# Patient Record
Sex: MALE | URBAN - METROPOLITAN AREA
[De-identification: names, ages, dates, MRNs, and addresses within clinical notes are randomized per-mention and may not be internally consistent; named-entity substitution may affect disease eponyms.]

---

## 2024-11-22 ENCOUNTER — TRANSCRIBE ORDERS (OUTPATIENT)
Dept: ADMINISTRATIVE | Age: 59
End: 2024-11-22

## 2024-11-22 DIAGNOSIS — F17.210 CIGARETTE SMOKER: Primary | ICD-10-CM

## 2024-12-16 ENCOUNTER — CLINICAL DOCUMENTATION (OUTPATIENT)
Dept: CASE MANAGEMENT | Age: 59
End: 2024-12-16

## 2024-12-16 VITALS — WEIGHT: 109.5 LBS | BODY MASS INDEX: 14.83 KG/M2 | HEIGHT: 72 IN

## 2025-07-15 ENCOUNTER — APPOINTMENT (OUTPATIENT)
Dept: CT IMAGING | Age: 60
DRG: 190 | End: 2025-07-15
Payer: MEDICARE

## 2025-07-15 ENCOUNTER — APPOINTMENT (OUTPATIENT)
Dept: GENERAL RADIOLOGY | Age: 60
DRG: 190 | End: 2025-07-15
Payer: MEDICARE

## 2025-07-15 ENCOUNTER — HOSPITAL ENCOUNTER (INPATIENT)
Age: 60
LOS: 8 days | Discharge: NURSING, REHAB OR LONG-TERM CARE | DRG: 190 | End: 2025-07-24
Attending: STUDENT IN AN ORGANIZED HEALTH CARE EDUCATION/TRAINING PROGRAM | Admitting: HOSPITALIST
Payer: MEDICARE

## 2025-07-15 DIAGNOSIS — R29.6 MULTIPLE FALLS: ICD-10-CM

## 2025-07-15 DIAGNOSIS — J44.1 COPD EXACERBATION (HCC): ICD-10-CM

## 2025-07-15 DIAGNOSIS — R62.7 FAILURE TO THRIVE IN ADULT: ICD-10-CM

## 2025-07-15 DIAGNOSIS — J96.22 ACUTE ON CHRONIC RESPIRATORY FAILURE WITH HYPERCAPNIA (HCC): Primary | ICD-10-CM

## 2025-07-15 DIAGNOSIS — S92.301A MULTIPLE CLOSED FRACTURES OF METATARSAL BONE OF RIGHT FOOT, INITIAL ENCOUNTER: ICD-10-CM

## 2025-07-15 DIAGNOSIS — Z91.199 NONCOMPLIANCE WITH TREATMENT: ICD-10-CM

## 2025-07-15 DIAGNOSIS — R78.81 BACTEREMIA DUE TO GRAM-POSITIVE BACTERIA: ICD-10-CM

## 2025-07-15 LAB
BASOPHILS # BLD: 0.01 K/UL (ref 0–0.2)
BASOPHILS NFR BLD: 0 % (ref 0–2)
BNP SERPL-MCNC: 314 PG/ML (ref 0–125)
CHP ED QC CHECK: NORMAL
EOSINOPHIL # BLD: 0.01 K/UL (ref 0.05–0.5)
EOSINOPHILS RELATIVE PERCENT: 0 % (ref 0–6)
ERYTHROCYTE [DISTWIDTH] IN BLOOD BY AUTOMATED COUNT: 12.3 % (ref 11.5–15)
GLUCOSE BLD-MCNC: 165 MG/DL
GLUCOSE BLD-MCNC: 165 MG/DL (ref 74–99)
HCT VFR BLD AUTO: 34.4 % (ref 37–54)
HGB BLD-MCNC: 10.7 G/DL (ref 12.5–16.5)
IMM GRANULOCYTES # BLD AUTO: <0.03 K/UL (ref 0–0.58)
IMM GRANULOCYTES NFR BLD: 0 % (ref 0–5)
LYMPHOCYTES NFR BLD: 0.88 K/UL (ref 1.5–4)
LYMPHOCYTES RELATIVE PERCENT: 16 % (ref 20–42)
MCH RBC QN AUTO: 32.3 PG (ref 26–35)
MCHC RBC AUTO-ENTMCNC: 31.1 G/DL (ref 32–34.5)
MCV RBC AUTO: 103.9 FL (ref 80–99.9)
MONOCYTES NFR BLD: 0.4 K/UL (ref 0.1–0.95)
MONOCYTES NFR BLD: 7 % (ref 2–12)
NEUTROPHILS NFR BLD: 76 % (ref 43–80)
NEUTS SEG NFR BLD: 4.3 K/UL (ref 1.8–7.3)
PLATELET # BLD AUTO: 174 K/UL (ref 130–450)
PMV BLD AUTO: 11.2 FL (ref 7–12)
RBC # BLD AUTO: 3.31 M/UL (ref 3.8–5.8)
TROPONIN I SERPL HS-MCNC: 28 NG/L (ref 0–22)
WBC OTHER # BLD: 5.6 K/UL (ref 4.5–11.5)

## 2025-07-15 PROCEDURE — 94640 AIRWAY INHALATION TREATMENT: CPT

## 2025-07-15 PROCEDURE — 85025 COMPLETE CBC W/AUTO DIFF WBC: CPT

## 2025-07-15 PROCEDURE — 82550 ASSAY OF CK (CPK): CPT

## 2025-07-15 PROCEDURE — 99285 EMERGENCY DEPT VISIT HI MDM: CPT

## 2025-07-15 PROCEDURE — 83880 ASSAY OF NATRIURETIC PEPTIDE: CPT

## 2025-07-15 PROCEDURE — 82962 GLUCOSE BLOOD TEST: CPT

## 2025-07-15 PROCEDURE — 84145 PROCALCITONIN (PCT): CPT

## 2025-07-15 PROCEDURE — 73630 X-RAY EXAM OF FOOT: CPT

## 2025-07-15 PROCEDURE — 93005 ELECTROCARDIOGRAM TRACING: CPT

## 2025-07-15 PROCEDURE — 73590 X-RAY EXAM OF LOWER LEG: CPT

## 2025-07-15 PROCEDURE — 94664 DEMO&/EVAL PT USE INHALER: CPT

## 2025-07-15 PROCEDURE — 71045 X-RAY EXAM CHEST 1 VIEW: CPT

## 2025-07-15 PROCEDURE — 80053 COMPREHEN METABOLIC PANEL: CPT

## 2025-07-15 PROCEDURE — 0202U NFCT DS 22 TRGT SARS-COV-2: CPT

## 2025-07-15 PROCEDURE — 6370000000 HC RX 637 (ALT 250 FOR IP)

## 2025-07-15 PROCEDURE — 84484 ASSAY OF TROPONIN QUANT: CPT

## 2025-07-15 RX ORDER — METHYLPREDNISOLONE SODIUM SUCCINATE 125 MG/2ML
125 INJECTION INTRAMUSCULAR; INTRAVENOUS ONCE
Status: COMPLETED | OUTPATIENT
Start: 2025-07-15 | End: 2025-07-16

## 2025-07-15 RX ORDER — IPRATROPIUM BROMIDE AND ALBUTEROL SULFATE 2.5; .5 MG/3ML; MG/3ML
3 SOLUTION RESPIRATORY (INHALATION) ONCE
Status: COMPLETED | OUTPATIENT
Start: 2025-07-15 | End: 2025-07-15

## 2025-07-15 RX ADMIN — IPRATROPIUM BROMIDE AND ALBUTEROL SULFATE 3 DOSE: .5; 2.5 SOLUTION RESPIRATORY (INHALATION) at 23:14

## 2025-07-16 ENCOUNTER — APPOINTMENT (OUTPATIENT)
Dept: CT IMAGING | Age: 60
DRG: 190 | End: 2025-07-16
Payer: MEDICARE

## 2025-07-16 PROBLEM — F10.10 ALCOHOL ABUSE: Status: ACTIVE | Noted: 2025-07-16

## 2025-07-16 PROBLEM — R41.0 DISORIENTATION, UNSPECIFIED: Status: ACTIVE | Noted: 2025-07-16

## 2025-07-16 PROBLEM — J96.22 ACUTE ON CHRONIC RESPIRATORY FAILURE WITH HYPERCAPNIA (HCC): Chronic | Status: ACTIVE | Noted: 2025-07-16

## 2025-07-16 PROBLEM — J96.20 ACUTE ON CHRONIC RESPIRATORY FAILURE (HCC): Status: ACTIVE | Noted: 2025-07-16

## 2025-07-16 PROBLEM — D80.2 IMMUNOGLOBULIN A DEFICIENCY (HCC): Status: ACTIVE | Noted: 2025-07-16

## 2025-07-16 PROBLEM — E87.20 LACTIC ACIDOSIS: Chronic | Status: ACTIVE | Noted: 2025-07-16

## 2025-07-16 PROBLEM — K21.9 GASTROESOPHAGEAL REFLUX DISEASE: Status: ACTIVE | Noted: 2021-09-02

## 2025-07-16 PROBLEM — I49.9 VENTRICULAR ARRHYTHMIA: Status: ACTIVE | Noted: 2025-07-16

## 2025-07-16 PROBLEM — J96.22 ACUTE ON CHRONIC RESPIRATORY FAILURE WITH HYPERCAPNIA (HCC): Status: ACTIVE | Noted: 2025-07-16

## 2025-07-16 PROBLEM — E55.9 VITAMIN D DEFICIENCY: Status: ACTIVE | Noted: 2025-07-16

## 2025-07-16 PROBLEM — G47.00 INSOMNIA: Status: ACTIVE | Noted: 2025-07-16

## 2025-07-16 PROBLEM — J44.9 CHRONIC OBSTRUCTIVE PULMONARY DISEASE (HCC): Status: ACTIVE | Noted: 2021-09-02

## 2025-07-16 PROBLEM — E43 SEVERE PROTEIN-CALORIE MALNUTRITION: Status: ACTIVE | Noted: 2025-07-16

## 2025-07-16 PROBLEM — R13.10 DYSPHAGIA: Status: ACTIVE | Noted: 2025-07-16

## 2025-07-16 PROBLEM — F17.200 HEAVY TOBACCO SMOKER: Status: ACTIVE | Noted: 2025-07-16

## 2025-07-16 PROBLEM — J44.1 COPD EXACERBATION (HCC): Status: ACTIVE | Noted: 2021-09-02

## 2025-07-16 PROBLEM — J44.1 COPD EXACERBATION (HCC): Chronic | Status: ACTIVE | Noted: 2021-09-02

## 2025-07-16 PROBLEM — K63.5 COLONIC POLYP: Status: ACTIVE | Noted: 2025-07-16

## 2025-07-16 PROBLEM — E87.20 LACTIC ACIDOSIS: Status: ACTIVE | Noted: 2025-07-16

## 2025-07-16 LAB
ALBUMIN SERPL-MCNC: 4.3 G/DL (ref 3.5–5.2)
ALP SERPL-CCNC: 63 U/L (ref 40–129)
ALT SERPL-CCNC: 19 U/L (ref 0–50)
ANION GAP SERPL CALCULATED.3IONS-SCNC: 11 MMOL/L (ref 7–16)
AST SERPL-CCNC: 33 U/L (ref 0–50)
B PARAP IS1001 DNA NPH QL NAA+NON-PROBE: NOT DETECTED
B PERT DNA SPEC QL NAA+PROBE: NOT DETECTED
B.E.: 18.1 MMOL/L (ref -3–3)
B.E.: 19.3 MMOL/L (ref -3–3)
B.E.: 22.8 MMOL/L (ref -3–3)
BILIRUB SERPL-MCNC: 0.4 MG/DL (ref 0–1.2)
BILIRUB UR QL STRIP: NEGATIVE
BUN SERPL-MCNC: 14 MG/DL (ref 8–23)
C PNEUM DNA NPH QL NAA+NON-PROBE: NOT DETECTED
CALCIUM SERPL-MCNC: 9.7 MG/DL (ref 8.8–10.2)
CHLORIDE SERPL-SCNC: 86 MMOL/L (ref 98–107)
CK SERPL-CCNC: 200 U/L (ref 0–190)
CLARITY UR: CLEAR
CO2 SERPL-SCNC: 44 MMOL/L (ref 22–29)
COHB: 0.3 % (ref 0–1.5)
COHB: 0.9 % (ref 0–1.5)
COHB: 3.4 % (ref 0–1.5)
COLOR UR: YELLOW
CREAT SERPL-MCNC: 0.5 MG/DL (ref 0.7–1.2)
CRITICAL: ABNORMAL
DATE ANALYZED: ABNORMAL
DATE OF COLLECTION: ABNORMAL
FIO2: 100 %
FLUAV RNA NPH QL NAA+NON-PROBE: NOT DETECTED
FLUBV RNA NPH QL NAA+NON-PROBE: NOT DETECTED
GFR, ESTIMATED: >90 ML/MIN/1.73M2
GLUCOSE SERPL-MCNC: 143 MG/DL (ref 74–99)
GLUCOSE UR STRIP-MCNC: NEGATIVE MG/DL
HADV DNA NPH QL NAA+NON-PROBE: NOT DETECTED
HCO3: 49.3 MMOL/L (ref 22–26)
HCO3: 50.5 MMOL/L (ref 22–26)
HCO3: 51.7 MMOL/L (ref 22–26)
HCOV 229E RNA NPH QL NAA+NON-PROBE: NOT DETECTED
HCOV HKU1 RNA NPH QL NAA+NON-PROBE: NOT DETECTED
HCOV NL63 RNA NPH QL NAA+NON-PROBE: NOT DETECTED
HCOV OC43 RNA NPH QL NAA+NON-PROBE: NOT DETECTED
HGB UR QL STRIP.AUTO: ABNORMAL
HHB: 0.2 % (ref 0–5)
HHB: 1.1 % (ref 0–5)
HHB: 72.1 % (ref 0–5)
HMPV RNA NPH QL NAA+NON-PROBE: NOT DETECTED
HPIV1 RNA NPH QL NAA+NON-PROBE: NOT DETECTED
HPIV2 RNA NPH QL NAA+NON-PROBE: NOT DETECTED
HPIV3 RNA NPH QL NAA+NON-PROBE: NOT DETECTED
HPIV4 RNA NPH QL NAA+NON-PROBE: NOT DETECTED
KETONES UR STRIP-MCNC: NEGATIVE MG/DL
LAB: ABNORMAL
LACTATE BLDV-SCNC: 1.7 MMOL/L (ref 0.5–2.2)
LACTATE BLDV-SCNC: 2.7 MMOL/L (ref 0.5–2.2)
LACTATE BLDV-SCNC: 3.7 MMOL/L (ref 0.5–1.9)
LACTATE BLDV-SCNC: 4.5 MMOL/L (ref 0.5–1.9)
LEUKOCYTE ESTERASE UR QL STRIP: NEGATIVE
Lab: 1028
Lab: 142
Lab: 321
M PNEUMO DNA NPH QL NAA+NON-PROBE: NOT DETECTED
METHB: 0 % (ref 0–1.5)
METHB: 0.3 % (ref 0–1.5)
METHB: 1.3 % (ref 0–1.5)
MODE: ABNORMAL
NITRITE UR QL STRIP: NEGATIVE
O2 CONTENT: 14 ML/DL
O2 CONTENT: 15.5 ML/DL
O2 SATURATION: 24.3 % (ref 92–98.5)
O2 SATURATION: 98.9 % (ref 92–98.5)
O2 SATURATION: 99.8 % (ref 92–98.5)
O2HB: 23.2 % (ref 94–97)
O2HB: 98.3 % (ref 94–97)
O2HB: 98.9 % (ref 94–97)
OPERATOR ID: 1023
OPERATOR ID: 8634
OPERATOR ID: ABNORMAL
PATIENT TEMP: 37 C
PCO2: 126.6 MMHG (ref 35–45)
PCO2: 89.2 MMHG (ref 35–45)
PCO2: 99.7 MMHG (ref 35–45)
PEEP/CPAP: 6 CMH2O
PFO2: 4.68 MMHG/%
PH BLOOD GAS: 7.22 (ref 7.35–7.45)
PH BLOOD GAS: 7.31 (ref 7.35–7.45)
PH BLOOD GAS: 7.38 (ref 7.35–7.45)
PH UR STRIP: 7 [PH] (ref 5–8)
PO2: 144.2 MMHG (ref 75–100)
PO2: 468.1 MMHG (ref 75–100)
PO2: <20 MMHG (ref 75–100)
POTASSIUM SERPL-SCNC: 4.7 MMOL/L (ref 3.5–5.1)
PROCALCITONIN SERPL-MCNC: 0.09 NG/ML (ref 0–0.08)
PROT SERPL-MCNC: 7.6 G/DL (ref 6.4–8.3)
PROT UR STRIP-MCNC: ABNORMAL MG/DL
PS: 12 CMH20
RBC #/AREA URNS HPF: ABNORMAL /HPF
RI(T): 0.31
RSV RNA NPH QL NAA+NON-PROBE: NOT DETECTED
RV+EV RNA NPH QL NAA+NON-PROBE: NOT DETECTED
SARS-COV-2 RNA NPH QL NAA+NON-PROBE: NOT DETECTED
SODIUM SERPL-SCNC: 141 MMOL/L (ref 136–145)
SOURCE, BLOOD GAS: ABNORMAL
SP GR UR STRIP: 1.01 (ref 1–1.03)
SPECIMEN DESCRIPTION: NORMAL
THB: 10.2 G/DL (ref 11.5–16.5)
THB: 11.1 G/DL (ref 11.5–16.5)
THB: 9.9 G/DL (ref 11.5–16.5)
TIME ANALYZED: 1031
TIME ANALYZED: 146
TIME ANALYZED: 326
TROPONIN I SERPL HS-MCNC: 26 NG/L (ref 0–22)
UROBILINOGEN UR STRIP-ACNC: 0.2 EU/DL (ref 0–1)
WBC #/AREA URNS HPF: ABNORMAL /HPF

## 2025-07-16 PROCEDURE — 82805 BLOOD GASES W/O2 SATURATION: CPT

## 2025-07-16 PROCEDURE — 94660 CPAP INITIATION&MGMT: CPT

## 2025-07-16 PROCEDURE — 96361 HYDRATE IV INFUSION ADD-ON: CPT

## 2025-07-16 PROCEDURE — 6360000002 HC RX W HCPCS

## 2025-07-16 PROCEDURE — 87077 CULTURE AEROBIC IDENTIFY: CPT

## 2025-07-16 PROCEDURE — 6370000000 HC RX 637 (ALT 250 FOR IP)

## 2025-07-16 PROCEDURE — 2700000000 HC OXYGEN THERAPY PER DAY

## 2025-07-16 PROCEDURE — 2580000003 HC RX 258: Performed by: HOSPITALIST

## 2025-07-16 PROCEDURE — 6360000002 HC RX W HCPCS: Performed by: HOSPITALIST

## 2025-07-16 PROCEDURE — 2580000003 HC RX 258

## 2025-07-16 PROCEDURE — 87086 URINE CULTURE/COLONY COUNT: CPT

## 2025-07-16 PROCEDURE — 87040 BLOOD CULTURE FOR BACTERIA: CPT

## 2025-07-16 PROCEDURE — 2500000003 HC RX 250 WO HCPCS: Performed by: HOSPITALIST

## 2025-07-16 PROCEDURE — 83605 ASSAY OF LACTIC ACID: CPT

## 2025-07-16 PROCEDURE — 96374 THER/PROPH/DIAG INJ IV PUSH: CPT

## 2025-07-16 PROCEDURE — 72125 CT NECK SPINE W/O DYE: CPT

## 2025-07-16 PROCEDURE — 84484 ASSAY OF TROPONIN QUANT: CPT

## 2025-07-16 PROCEDURE — 87150 DNA/RNA AMPLIFIED PROBE: CPT

## 2025-07-16 PROCEDURE — 70450 CT HEAD/BRAIN W/O DYE: CPT

## 2025-07-16 PROCEDURE — 6370000000 HC RX 637 (ALT 250 FOR IP): Performed by: HOSPITALIST

## 2025-07-16 PROCEDURE — 2060000000 HC ICU INTERMEDIATE R&B

## 2025-07-16 PROCEDURE — 81001 URINALYSIS AUTO W/SCOPE: CPT

## 2025-07-16 PROCEDURE — 99223 1ST HOSP IP/OBS HIGH 75: CPT | Performed by: HOSPITALIST

## 2025-07-16 PROCEDURE — 94640 AIRWAY INHALATION TREATMENT: CPT

## 2025-07-16 RX ORDER — LORAZEPAM 2 MG/ML
4 INJECTION INTRAMUSCULAR
Status: DISCONTINUED | OUTPATIENT
Start: 2025-07-16 | End: 2025-07-16 | Stop reason: RX

## 2025-07-16 RX ORDER — LORAZEPAM 2 MG/ML
1 INJECTION INTRAMUSCULAR
Status: DISCONTINUED | OUTPATIENT
Start: 2025-07-16 | End: 2025-07-16 | Stop reason: RX

## 2025-07-16 RX ORDER — ONDANSETRON 2 MG/ML
4 INJECTION INTRAMUSCULAR; INTRAVENOUS EVERY 6 HOURS PRN
Status: DISCONTINUED | OUTPATIENT
Start: 2025-07-16 | End: 2025-07-24 | Stop reason: HOSPADM

## 2025-07-16 RX ORDER — LORAZEPAM 2 MG/ML
2 INJECTION INTRAMUSCULAR
Status: DISCONTINUED | OUTPATIENT
Start: 2025-07-16 | End: 2025-07-16 | Stop reason: RX

## 2025-07-16 RX ORDER — SODIUM CHLORIDE 0.9 % (FLUSH) 0.9 %
5-40 SYRINGE (ML) INJECTION EVERY 12 HOURS SCHEDULED
Status: DISCONTINUED | OUTPATIENT
Start: 2025-07-16 | End: 2025-07-24 | Stop reason: HOSPADM

## 2025-07-16 RX ORDER — MULTIVITAMIN WITH IRON
1 TABLET ORAL DAILY
Status: DISCONTINUED | OUTPATIENT
Start: 2025-07-16 | End: 2025-07-24 | Stop reason: HOSPADM

## 2025-07-16 RX ORDER — ACETAMINOPHEN 325 MG/1
650 TABLET ORAL EVERY 6 HOURS PRN
Status: DISCONTINUED | OUTPATIENT
Start: 2025-07-16 | End: 2025-07-24 | Stop reason: HOSPADM

## 2025-07-16 RX ORDER — ROSUVASTATIN CALCIUM 20 MG/1
20 TABLET, COATED ORAL NIGHTLY
Status: ON HOLD | COMMUNITY

## 2025-07-16 RX ORDER — ALBUTEROL SULFATE 0.83 MG/ML
2.5 SOLUTION RESPIRATORY (INHALATION) 4 TIMES DAILY PRN
Status: ON HOLD | COMMUNITY

## 2025-07-16 RX ORDER — SODIUM CHLORIDE 0.9 % (FLUSH) 0.9 %
5-40 SYRINGE (ML) INJECTION PRN
Status: DISCONTINUED | OUTPATIENT
Start: 2025-07-16 | End: 2025-07-16 | Stop reason: SDUPTHER

## 2025-07-16 RX ORDER — LORAZEPAM 1 MG/1
4 TABLET ORAL
Status: DISCONTINUED | OUTPATIENT
Start: 2025-07-16 | End: 2025-07-24 | Stop reason: HOSPADM

## 2025-07-16 RX ORDER — ENOXAPARIN SODIUM 100 MG/ML
30 INJECTION SUBCUTANEOUS DAILY
Status: DISCONTINUED | OUTPATIENT
Start: 2025-07-16 | End: 2025-07-20

## 2025-07-16 RX ORDER — LORAZEPAM 1 MG/1
2 TABLET ORAL
Status: DISCONTINUED | OUTPATIENT
Start: 2025-07-16 | End: 2025-07-24 | Stop reason: HOSPADM

## 2025-07-16 RX ORDER — LORAZEPAM 2 MG/ML
3 INJECTION INTRAMUSCULAR
Status: DISCONTINUED | OUTPATIENT
Start: 2025-07-16 | End: 2025-07-16 | Stop reason: RX

## 2025-07-16 RX ORDER — SODIUM CHLORIDE 9 MG/ML
INJECTION, SOLUTION INTRAVENOUS PRN
Status: DISCONTINUED | OUTPATIENT
Start: 2025-07-16 | End: 2025-07-24 | Stop reason: HOSPADM

## 2025-07-16 RX ORDER — ALBUTEROL SULFATE 0.83 MG/ML
2.5 SOLUTION RESPIRATORY (INHALATION) EVERY 6 HOURS PRN
Status: DISCONTINUED | OUTPATIENT
Start: 2025-07-16 | End: 2025-07-24 | Stop reason: HOSPADM

## 2025-07-16 RX ORDER — 0.9 % SODIUM CHLORIDE 0.9 %
1000 INTRAVENOUS SOLUTION INTRAVENOUS ONCE
Status: COMPLETED | OUTPATIENT
Start: 2025-07-16 | End: 2025-07-16

## 2025-07-16 RX ORDER — SODIUM CHLORIDE 9 MG/ML
INJECTION, SOLUTION INTRAVENOUS PRN
Status: DISCONTINUED | OUTPATIENT
Start: 2025-07-16 | End: 2025-07-16 | Stop reason: SDUPTHER

## 2025-07-16 RX ORDER — PREDNISONE 20 MG/1
40 TABLET ORAL
Status: DISCONTINUED | OUTPATIENT
Start: 2025-07-18 | End: 2025-07-16

## 2025-07-16 RX ORDER — ONDANSETRON 4 MG/1
4 TABLET, ORALLY DISINTEGRATING ORAL EVERY 8 HOURS PRN
Status: DISCONTINUED | OUTPATIENT
Start: 2025-07-16 | End: 2025-07-24 | Stop reason: HOSPADM

## 2025-07-16 RX ORDER — OMEPRAZOLE 20 MG/1
20 CAPSULE, DELAYED RELEASE ORAL EVERY MORNING
Status: ON HOLD | COMMUNITY
End: 2025-07-24 | Stop reason: HOSPADM

## 2025-07-16 RX ORDER — SODIUM CHLORIDE 0.9 % (FLUSH) 0.9 %
5-40 SYRINGE (ML) INJECTION EVERY 12 HOURS SCHEDULED
Status: DISCONTINUED | OUTPATIENT
Start: 2025-07-16 | End: 2025-07-16 | Stop reason: SDUPTHER

## 2025-07-16 RX ORDER — ARFORMOTEROL TARTRATE 15 UG/2ML
15 SOLUTION RESPIRATORY (INHALATION)
Status: DISCONTINUED | OUTPATIENT
Start: 2025-07-16 | End: 2025-07-24 | Stop reason: HOSPADM

## 2025-07-16 RX ORDER — LANOLIN ALCOHOL/MO/W.PET/CERES
100 CREAM (GRAM) TOPICAL DAILY
Status: DISCONTINUED | OUTPATIENT
Start: 2025-07-17 | End: 2025-07-24 | Stop reason: HOSPADM

## 2025-07-16 RX ORDER — IPRATROPIUM BROMIDE AND ALBUTEROL SULFATE 2.5; .5 MG/3ML; MG/3ML
1 SOLUTION RESPIRATORY (INHALATION)
Status: DISCONTINUED | OUTPATIENT
Start: 2025-07-16 | End: 2025-07-16

## 2025-07-16 RX ORDER — THIAMINE MONONITRATE (VIT B1) 100 MG
100 TABLET ORAL EVERY MORNING
Status: ON HOLD | COMMUNITY

## 2025-07-16 RX ORDER — THIAMINE HYDROCHLORIDE 100 MG/ML
200 INJECTION, SOLUTION INTRAMUSCULAR; INTRAVENOUS ONCE
Status: COMPLETED | OUTPATIENT
Start: 2025-07-16 | End: 2025-07-16

## 2025-07-16 RX ORDER — BENZONATATE 100 MG/1
100 CAPSULE ORAL 3 TIMES DAILY PRN
Status: DISCONTINUED | OUTPATIENT
Start: 2025-07-16 | End: 2025-07-24 | Stop reason: HOSPADM

## 2025-07-16 RX ORDER — METHYLPREDNISOLONE SODIUM SUCCINATE 40 MG/ML
40 INJECTION INTRAMUSCULAR; INTRAVENOUS EVERY 6 HOURS
Status: DISCONTINUED | OUTPATIENT
Start: 2025-07-16 | End: 2025-07-16

## 2025-07-16 RX ORDER — SODIUM CHLORIDE 9 MG/ML
INJECTION, SOLUTION INTRAVENOUS CONTINUOUS
Status: ACTIVE | OUTPATIENT
Start: 2025-07-16 | End: 2025-07-18

## 2025-07-16 RX ORDER — FOLIC ACID 1 MG/1
1 TABLET ORAL DAILY
Status: DISCONTINUED | OUTPATIENT
Start: 2025-07-16 | End: 2025-07-24 | Stop reason: HOSPADM

## 2025-07-16 RX ORDER — METHYLPREDNISOLONE SODIUM SUCCINATE 40 MG/ML
40 INJECTION INTRAMUSCULAR; INTRAVENOUS EVERY 8 HOURS
Status: DISCONTINUED | OUTPATIENT
Start: 2025-07-16 | End: 2025-07-17

## 2025-07-16 RX ORDER — LORAZEPAM 1 MG/1
1 TABLET ORAL
Status: DISCONTINUED | OUTPATIENT
Start: 2025-07-16 | End: 2025-07-24 | Stop reason: HOSPADM

## 2025-07-16 RX ORDER — POLYETHYLENE GLYCOL 3350 17 G/17G
17 POWDER, FOR SOLUTION ORAL DAILY PRN
Status: DISCONTINUED | OUTPATIENT
Start: 2025-07-16 | End: 2025-07-24 | Stop reason: HOSPADM

## 2025-07-16 RX ORDER — SODIUM CHLORIDE 0.9 % (FLUSH) 0.9 %
5-40 SYRINGE (ML) INJECTION PRN
Status: DISCONTINUED | OUTPATIENT
Start: 2025-07-16 | End: 2025-07-24 | Stop reason: HOSPADM

## 2025-07-16 RX ORDER — ALBUTEROL SULFATE 90 UG/1
2 INHALANT RESPIRATORY (INHALATION) 4 TIMES DAILY PRN
Status: ON HOLD | COMMUNITY

## 2025-07-16 RX ORDER — FOLIC ACID 1 MG/1
1 TABLET ORAL EVERY MORNING
Status: ON HOLD | COMMUNITY

## 2025-07-16 RX ORDER — ACETAMINOPHEN 650 MG/1
650 SUPPOSITORY RECTAL EVERY 6 HOURS PRN
Status: DISCONTINUED | OUTPATIENT
Start: 2025-07-16 | End: 2025-07-24 | Stop reason: HOSPADM

## 2025-07-16 RX ORDER — LORAZEPAM 1 MG/1
3 TABLET ORAL
Status: DISCONTINUED | OUTPATIENT
Start: 2025-07-16 | End: 2025-07-24 | Stop reason: HOSPADM

## 2025-07-16 RX ORDER — LACTOSE-REDUCED FOOD
1 LIQUID (ML) ORAL 4 TIMES DAILY
Status: ON HOLD | COMMUNITY

## 2025-07-16 RX ORDER — BUDESONIDE 0.5 MG/2ML
0.5 INHALANT ORAL
Status: DISCONTINUED | OUTPATIENT
Start: 2025-07-16 | End: 2025-07-24 | Stop reason: HOSPADM

## 2025-07-16 RX ORDER — IPRATROPIUM BROMIDE AND ALBUTEROL SULFATE 2.5; .5 MG/3ML; MG/3ML
1 SOLUTION RESPIRATORY (INHALATION)
Status: DISCONTINUED | OUTPATIENT
Start: 2025-07-16 | End: 2025-07-24 | Stop reason: HOSPADM

## 2025-07-16 RX ORDER — METOPROLOL SUCCINATE 50 MG/1
25 TABLET, EXTENDED RELEASE ORAL EVERY MORNING
Status: ON HOLD | COMMUNITY

## 2025-07-16 RX ORDER — METOPROLOL SUCCINATE 25 MG/1
25 TABLET, EXTENDED RELEASE ORAL DAILY
Status: DISCONTINUED | OUTPATIENT
Start: 2025-07-16 | End: 2025-07-24 | Stop reason: HOSPADM

## 2025-07-16 RX ORDER — FERROUS SULFATE 325(65) MG
325 TABLET ORAL EVERY MORNING
Status: ON HOLD | COMMUNITY

## 2025-07-16 RX ADMIN — METHYLPREDNISOLONE SODIUM SUCCINATE 125 MG: 125 INJECTION INTRAMUSCULAR; INTRAVENOUS at 00:02

## 2025-07-16 RX ADMIN — AZITHROMYCIN MONOHYDRATE 500 MG: 500 INJECTION, POWDER, LYOPHILIZED, FOR SOLUTION INTRAVENOUS at 13:03

## 2025-07-16 RX ADMIN — ENOXAPARIN SODIUM 30 MG: 100 INJECTION SUBCUTANEOUS at 12:32

## 2025-07-16 RX ADMIN — THIAMINE HYDROCHLORIDE 200 MG: 100 INJECTION, SOLUTION INTRAMUSCULAR; INTRAVENOUS at 12:33

## 2025-07-16 RX ADMIN — METOPROLOL SUCCINATE 25 MG: 25 TABLET, EXTENDED RELEASE ORAL at 12:32

## 2025-07-16 RX ADMIN — IPRATROPIUM BROMIDE AND ALBUTEROL SULFATE 1 DOSE: 2.5; .5 SOLUTION RESPIRATORY (INHALATION) at 21:11

## 2025-07-16 RX ADMIN — SODIUM CHLORIDE: 0.9 INJECTION, SOLUTION INTRAVENOUS at 12:32

## 2025-07-16 RX ADMIN — FOLIC ACID 1 MG: 1 TABLET ORAL at 12:33

## 2025-07-16 RX ADMIN — METHYLPREDNISOLONE SODIUM SUCCINATE 40 MG: 40 INJECTION, POWDER, LYOPHILIZED, FOR SOLUTION INTRAMUSCULAR; INTRAVENOUS at 22:04

## 2025-07-16 RX ADMIN — IPRATROPIUM BROMIDE AND ALBUTEROL SULFATE 1 DOSE: 2.5; .5 SOLUTION RESPIRATORY (INHALATION) at 15:40

## 2025-07-16 RX ADMIN — METHYLPREDNISOLONE SODIUM SUCCINATE 40 MG: 40 INJECTION, POWDER, LYOPHILIZED, FOR SOLUTION INTRAMUSCULAR; INTRAVENOUS at 12:33

## 2025-07-16 RX ADMIN — WATER 1000 MG: 1 INJECTION INTRAMUSCULAR; INTRAVENOUS; SUBCUTANEOUS at 12:33

## 2025-07-16 RX ADMIN — SODIUM CHLORIDE 1000 ML: 0.9 INJECTION, SOLUTION INTRAVENOUS at 01:02

## 2025-07-16 RX ADMIN — SODIUM CHLORIDE, PRESERVATIVE FREE 10 ML: 5 INJECTION INTRAVENOUS at 22:06

## 2025-07-16 RX ADMIN — SODIUM CHLORIDE, PRESERVATIVE FREE 10 ML: 5 INJECTION INTRAVENOUS at 12:42

## 2025-07-16 RX ADMIN — SODIUM CHLORIDE 1000 ML: 0.9 INJECTION, SOLUTION INTRAVENOUS at 03:42

## 2025-07-16 RX ADMIN — MULTIVITAMIN TABLET 1 TABLET: TABLET at 12:33

## 2025-07-16 RX ADMIN — ARFORMOTEROL TARTRATE 15 MCG: 15 SOLUTION RESPIRATORY (INHALATION) at 21:11

## 2025-07-16 RX ADMIN — BUDESONIDE 500 MCG: 0.5 SUSPENSION RESPIRATORY (INHALATION) at 21:11

## 2025-07-16 NOTE — ED PROVIDER NOTES
[facility] EMERGENCY DEPARTMENT  EMERGENCY DEPARTMENT ENCOUNTER        Pt Name: Anthony Tam  MRN: [id number]  Birthdate 1965  Date of evaluation: 7/15/2025  Provider: Luis Yang MD  PCP: No primary care provider on file.  Note Started: 10:49 PM EDT 7/15/25    CHIEF COMPLAINT       Chief Complaint   Patient presents with    Other     Failure to thrive. Patient not eating, incontinent, and weak. Shortness of breath. 76 percent at pivot, room air       HISTORY OF PRESENT ILLNESS: 1 or more Elements   History From: Patient, Patient's daughter at bedside    Limitations to history : Altered Mental Status    Anthony Tam is a 60 y.o. 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  Patient found by daughter today having urinary incontinence and fecal incontinence.  She is supposed to be on 4 L nasal cannula at home but states he has not been wearing it and has not been compliant with treatment regimen at this time.  States he is depressed but denies any suicidal or homicidal ideations at this time.  Has had multiple falls and daughter is concerned that patient is deteriorating and has been complaining of generalized weakness.  Patient does complain of generalized weakness and states he is short of breath at this time.  Also complains of right foot pain states right foot has been swelling after a previous fall and is concerned he could have fractured the right foot at this time.  Denies any other complaints.    Nursing Notes were all reviewed and agreed with or any disagreements were addressed in the HPI.    ROS:   Pertinent positives and negatives are stated within HPI, all other systems reviewed and are  Base         XR FOOT RIGHT (MIN 3 VIEWS)   Final Result   1.  Multiple fractures of metatarsal bones as above commented.      2.  As these fractures in the area/vicinity of the Lisfranc type of injury   consider further evaluation with CT scan of the right foot.         XR TIBIA FIBULA RIGHT (2 VIEWS)   Final Result   No acute fractures in the right tibia and fibula.           No results found.    No results found.    See preliminary interpretation by me below.     ------------------------- NURSING NOTES AND VITALS REVIEWED ---------------------------   The nursing notes within the ED encounter and vital signs as below have been reviewed by myself.  /64   Pulse 100   Temp 98.2 °F (36.8 °C)   Resp 24   Ht 1.829 m (6')   Wt 48.5 kg (107 lb)   SpO2 100%   BMI 14.51 kg/m²   Oxygen Saturation Interpretation: Improved after treatment    The patient’s available past medical records and past encounters were reviewed.      ------------------------------ ED COURSE/MEDICAL DECISION MAKING----------------------  Medications   thiamine tablet 100 mg (has no administration in time range)   ipratropium 0.5 mg-albuterol 2.5 mg (DUONEB) nebulizer solution 3 Dose (3 Doses Inhalation Given 7/15/25 2314)   methylPREDNISolone sodium succ (SOLU-MEDROL) injection 125 mg (125 mg IntraVENous Given 7/16/25 0002)   sodium chloride 0.9 % bolus 1,000 mL (0 mLs IntraVENous Stopped 7/16/25 0251)   sodium chloride 0.9 % bolus 1,000 mL (0 mLs IntraVENous Stopped 7/16/25 0452)       Medical Decision Making/Differential Diagnosis:    CC/HPI Summary, Pertinent Physical Exam Findings, Social Determinants of health, Records Reviewed, DDx, testing done/not done, ED Course, Reassessment, disposition considerations/shared decision making with patient, consults, disposition:      Medical Decision Making:   I, Dr. Luis Yang am the resident physician of record.      MDM: Anthony Tam is a 60 y.o. male who presents to the ED for

## 2025-07-16 NOTE — ED NOTES
ED to Inpatient Handoff Report    Notified Carrillo that electronic handoff available and patient ready for transport to room 644.    Safety Risks: Difficulty with daily activities and Risk of falls    Patient in Restraints: no    Constant Observer or Patient : no    Telemetry Monitoring Ordered :Yes      Cardiac Rhythm: Sinus tachy    Order to transfer to unit without monitor:YES    Last MEWS: 0 Time completed: 0956    Deterioration Index Score:   Predictive Model Details          36 (Caution)  Factor Value    Calculated 7/16/2025 10:37 30% Supplemental oxygen Nasal cannula    Deterioration Index Model 27% Age 60 years old     11% Cardiac rhythm Sinus tachy     10% Respiratory rate 14     9% Potassium 4.7 mmol/L     6% Pulse 100     4% Pulse oximetry 100 %     3% Systolic 131     1% Hematocrit abnormal (34.4 %)     0% WBC count 5.6 k/uL     0% Sodium 141 mmol/L     0% Blood pH 7.381     0% Temperature 98.2 °F (36.8 °C)        Vitals:    07/16/25 0447 07/16/25 0532 07/16/25 0638 07/16/25 0956   BP: 117/64  120/71 (!) 131/41   Pulse: 100 100 (!) 101 100   Resp: 24  19 14   Temp:    98.2 °F (36.8 °C)   TempSrc:    Oral   SpO2: 100% 100%  100%   Weight:       Height:             Opportunity for questions and clarification was provided.

## 2025-07-16 NOTE — PROGRESS NOTES
Patient brought supplied Melatonin 3 mg nightly prn. Dr. Plaza notified via [employer] regarding medication. Okay per Dr. Plaza for melatonin to be patient supplied and verified by pharmacy

## 2025-07-16 NOTE — H&P
[facility] Hospitalist Group History and Physical    PATIENT DEMOGRAPHICS  Name: Anthony Tam  Age: 60 y.o.  Sex: male      ASSESSMENT  Principal Problem:    Acute on chronic respiratory failure with hypercapnia (HCC)  Active Problems:    COPD exacerbation (HCC)    Lactic acidosis    Alcohol abuse    Heavy tobacco smoker    Immunoglobulin A deficiency (HCC)    Severe protein-calorie malnutrition    Gastroesophageal reflux disease    Insomnia  Resolved Problems:    * No resolved hospital problems. *         PLAN  Acute on Chronic Hypercapnic Hypoxemic Respiratory failure: POA, VBG? initial pH 7.21 paCO2 126.6, HCO3 50.5, BiPAP initiated with improvement to pH 7.31 pCO2 99.7 HCO3 49.3. on 4L nasal cannula ATC and NIPPV nightly at home. Consult to Pulmonary medicine  COPD exacerbation: POA, 2/2 to noncompliance with medications, scheduled duo-nebs q4hr while awake & prn. IV Solu-medrol Taper.  Tessalon Perles 100 mg q8hr prn. IV abx.  Lactic Acidosis: initial lactate 4.5, repeat 3.7.  History of ETOH use disorder: CIWA driven IV ativan, IV thiamine x 1 then  mg daily, MVI and folate  Tobacco Use Disorder: previously smoked over 2 ppd. Down to 2 cigarettes a day.  on cessation.   Protein Calorie Malnutrition: POA, BMI is 14.51 with recent weight loss, loss of deltoid, temporalis and interosseous musculature on exam as well as subcutaneous fat. Consult dietician for further nutritional assessment.   GERD: continue ppi             CARE COORDINATION  Consultations: Pulmonary Medicine  Code Status: Full Code   DVT PPx: [x]Lovenox []Heparin []SCDs [] Warfarin/NOAC []Encouraged ambulation  Disposition: []Med/Surg  []Med/Tele [x] Intermediate [] ICU/CCU  Admit status: [] Observation [x] Inpatient       CHIEF COMPLAINT  had concerns including Other (Failure to thrive. Patient not eating, incontinent, and weak. Shortness of breath. 76 percent at pivot, room air).    HISTORY OF PRESENT ILLNESS    Patient

## 2025-07-16 NOTE — PLAN OF CARE
Patient was seen and examined at bedside  Agree with nocturnist assessment and plan  Pulm was consulted continue IV steroid and nebulizer treatment low maintenance IV fluid for dehydration trend lactate every 6 lactic acidosis already improved

## 2025-07-16 NOTE — CONSULTS
Neptali Archer M.D.,[facility]  Ghulam Retana D.O., IRENE., [facility]  Jovana Angulo M.D.  Kristen Hess M.D.   Joe Burnett D.O.  Yonis Marques M.D.       Patient:  Anthony Tam 60 y.o. male MRN: [id number]           PULMONARY CONSULTATION    Reason for Consultation: Severe acute on chronic hypercapnic resp failure 2/2 AECOPD  Referring Physician: Frederic Hardy DO    Communication with the referring physician will be sent via the electronic medical record.    Chief Complaint: Failure to thrive    CODE STATUS: FULL CODE    SUBJECTIVE:  HPI:  Anthony Tam is a 60 y.o. male not previously known to our service that we are asked to evaluate for hypercapnic respiratory failure.    Anthony was brought into the hospital after being found by his daughter with incontinence.  At baseline he wears 4 L via nasal cannula but she reported he was not wearing it.  He had altered mental status and was disoriented.  Initial labs show a procalcitonin of 0.09, lactic acid 4.5, proBNP 314, troponin 28, total CK2 100, WBCs 5.6, respiratory panel negative, chest x-ray showing emphysema with likely right-sided bullous formation.  Initial ABGs showed severe respiratory acidosis with a pH of 7.21, pCO2 126.6, HCO3 50.5 while on 4 L nasal cannula.  He was placed on BiPAP 12/6 with improvement.  Last set of blood gases showed compensation with a pH of 7.38, PCO2 89.2, HCO3 51.7.  He received 2 L of IV fluid boluses and repeat lactic acid came down to 2.7.  Mentation improved.    [facility].  He does see a pulmonologist.  His home regimen consists of 4 L continuous.  He states he has 3 inhalers a \"read one\" that he uses as needed, a \"blue one\" that he uses once a day and rinses after, and a \"green one\" that he uses once or twice a day, as he is unsure.  He states that he believes he had a pulmonary function test done approximately 4 to 5 years ago.  He does not have any PAP device at home.  He

## 2025-07-16 NOTE — PROGRESS NOTES
.4 Eyes Skin Assessment     NAME:  Anthony Tam  YOB: 1965  MEDICAL RECORD NUMBER:  [id number]    The patient is being assessed for  Admission    I agree that at least one RN has performed a thorough Head to Toe Skin Assessment on the patient. ALL assessment sites listed below have been assessed.      Areas assessed by both nurses:    Head, Face, Ears, Shoulders, Back, Chest, Arms, Elbows, Hands, Sacrum. Buttock, Coccyx, Ischium, Legs. Feet and Heels, and Under Medical Devices     Dry, flaky, cracked bilateral heels and feet  Boggy bilateral heels        Does the Patient have a Wound? No noted wound(s)       Dameon Prevention initiated by RN: Yes  Wound Care Orders initiated by RN: No    Pressure Injury (Stage 1,2,3,4, Unstageable, DTI, NWPT, and Complex wounds) if present, place Wound referral order by RN under : No    New Ostomies, if present place, Ostomy referral order under : No     Nurse 1 eSignature: Electronically signed by Hilary Ibarra RN on 7/16/25 at 11:40 AM EDT    **SHARE this note so that the co-signing nurse can place an eSignature**    Nurse 2 eSignature: Electronically signed by Fide Pool RN on 7/16/25 at 11:48 AM EDT

## 2025-07-17 ENCOUNTER — APPOINTMENT (OUTPATIENT)
Age: 60
DRG: 190 | End: 2025-07-17
Attending: INTERNAL MEDICINE
Payer: MEDICARE

## 2025-07-17 LAB
ANION GAP SERPL CALCULATED.3IONS-SCNC: 8 MMOL/L (ref 7–16)
B.E.: 17.6 MMOL/L (ref -3–3)
BASOPHILS # BLD: 0 K/UL (ref 0–0.2)
BASOPHILS NFR BLD: 0 % (ref 0–2)
BUN SERPL-MCNC: 12 MG/DL (ref 8–23)
CALCIUM SERPL-MCNC: 8.7 MG/DL (ref 8.8–10.2)
CHLORIDE SERPL-SCNC: 95 MMOL/L (ref 98–107)
CO2 SERPL-SCNC: 39 MMOL/L (ref 22–29)
COHB: 0 % (ref 0–1.5)
CREAT SERPL-MCNC: 0.5 MG/DL (ref 0.7–1.2)
CRITICAL: ABNORMAL
DATE ANALYZED: ABNORMAL
DATE OF COLLECTION: ABNORMAL
ECHO AO ANNULUS INDEX: 2.08 CM/M2
ECHO AV ANNULUS DIAM: 3.5 CM
ECHO AV AREA PEAK VELOCITY: 2 CM2
ECHO AV AREA VTI: 2 CM2
ECHO AV AREA/BSA PEAK VELOCITY: 1.2 CM2/M2
ECHO AV AREA/BSA VTI: 1.2 CM2/M2
ECHO AV CUSP MM: 1.4 CM
ECHO AV MEAN GRADIENT: 4 MMHG
ECHO AV MEAN VELOCITY: 0.8 M/S
ECHO AV PEAK GRADIENT: 8 MMHG
ECHO AV PEAK VELOCITY: 1.4 M/S
ECHO AV VELOCITY RATIO: 0.64
ECHO AV VTI: 23.9 CM
ECHO BSA: 1.57 M2
ECHO EST RA PRESSURE: 3 MMHG
ECHO LA DIAMETER INDEX: 1.55 CM/M2
ECHO LA DIAMETER: 2.6 CM
ECHO LA VOL A-L A2C: 41 ML (ref 18–58)
ECHO LA VOL A-L A4C: 46 ML (ref 18–58)
ECHO LA VOL MOD A2C: 38 ML (ref 18–58)
ECHO LA VOL MOD A4C: 42 ML (ref 18–58)
ECHO LA VOLUME AREA LENGTH: 45 ML
ECHO LA VOLUME INDEX A-L A2C: 24 ML/M2 (ref 16–34)
ECHO LA VOLUME INDEX A-L A4C: 27 ML/M2 (ref 16–34)
ECHO LA VOLUME INDEX AREA LENGTH: 27 ML/M2 (ref 16–34)
ECHO LA VOLUME INDEX MOD A2C: 23 ML/M2 (ref 16–34)
ECHO LA VOLUME INDEX MOD A4C: 25 ML/M2 (ref 16–34)
ECHO LV E' LATERAL VELOCITY: 12 CM/S
ECHO LV E' SEPTAL VELOCITY: 9 CM/S
ECHO LV EDV A2C: 118 ML
ECHO LV EDV A4C: 101 ML
ECHO LV EDV BP: 112 ML (ref 67–155)
ECHO LV EDV INDEX A4C: 60 ML/M2
ECHO LV EDV INDEX BP: 67 ML/M2
ECHO LV EDV NDEX A2C: 70 ML/M2
ECHO LV EF PHYSICIAN: 60 %
ECHO LV EJECTION FRACTION A2C: 53 %
ECHO LV EJECTION FRACTION A4C: 47 %
ECHO LV EJECTION FRACTION BIPLANE: 51 % (ref 55–100)
ECHO LV ESV A2C: 56 ML
ECHO LV ESV A4C: 54 ML
ECHO LV ESV BP: 55 ML (ref 22–58)
ECHO LV ESV INDEX A2C: 33 ML/M2
ECHO LV ESV INDEX A4C: 32 ML/M2
ECHO LV ESV INDEX BP: 33 ML/M2
ECHO LV FRACTIONAL SHORTENING: 26 % (ref 28–44)
ECHO LV INTERNAL DIMENSION DIASTOLE INDEX: 2.98 CM/M2
ECHO LV INTERNAL DIMENSION DIASTOLIC: 5 CM (ref 4.2–5.9)
ECHO LV INTERNAL DIMENSION SYSTOLIC INDEX: 2.2 CM/M2
ECHO LV INTERNAL DIMENSION SYSTOLIC: 3.7 CM
ECHO LV ISOVOLUMETRIC RELAXATION TIME (IVRT): 106.1 MS
ECHO LV IVSD: 0.9 CM (ref 0.6–1)
ECHO LV IVSS: 1.2 CM
ECHO LV MASS 2D: 169.9 G (ref 88–224)
ECHO LV MASS INDEX 2D: 101.1 G/M2 (ref 49–115)
ECHO LV POSTERIOR WALL DIASTOLIC: 1 CM (ref 0.6–1)
ECHO LV POSTERIOR WALL SYSTOLIC: 1.4 CM
ECHO LV RELATIVE WALL THICKNESS RATIO: 0.4
ECHO LVOT AREA: 3.1 CM2
ECHO LVOT AV VTI INDEX: 0.64
ECHO LVOT DIAM: 2 CM
ECHO LVOT MEAN GRADIENT: 2 MMHG
ECHO LVOT PEAK GRADIENT: 3 MMHG
ECHO LVOT PEAK VELOCITY: 0.9 M/S
ECHO LVOT STROKE VOLUME INDEX: 28.6 ML/M2
ECHO LVOT SV: 48 ML
ECHO LVOT VTI: 15.3 CM
ECHO MV "A" WAVE DURATION: 110.7 MSEC
ECHO MV A VELOCITY: 0.76 M/S
ECHO MV AREA PHT: 3.3 CM2
ECHO MV AREA VTI: 2.5 CM2
ECHO MV E DECELERATION TIME (DT): 193.1 MS
ECHO MV E VELOCITY: 0.96 M/S
ECHO MV E/A RATIO: 1.26
ECHO MV E/E' LATERAL: 8
ECHO MV E/E' RATIO (AVERAGED): 9.33
ECHO MV E/E' SEPTAL: 10.67
ECHO MV LVOT VTI INDEX: 1.26
ECHO MV MAX VELOCITY: 1.1 M/S
ECHO MV MEAN GRADIENT: 3 MMHG
ECHO MV MEAN VELOCITY: 0.9 M/S
ECHO MV PEAK GRADIENT: 4 MMHG
ECHO MV PRESSURE HALF TIME (PHT): 66.3 MS
ECHO MV VTI: 19.3 CM
ECHO PV MAX VELOCITY: 1 M/S
ECHO PV MEAN GRADIENT: 2 MMHG
ECHO PV MEAN VELOCITY: 0.6 M/S
ECHO PV PEAK GRADIENT: 4 MMHG
ECHO PV VTI: 16.6 CM
ECHO PVEIN PEAK D VELOCITY: 0.5 M/S
ECHO PVEIN PEAK S VELOCITY: 0.7 M/S
ECHO PVEIN S/D RATIO: 1.4
ECHO RIGHT VENTRICULAR SYSTOLIC PRESSURE (RVSP): 35 MMHG
ECHO RV BASAL DIMENSION: 3.3 CM
ECHO RV INTERNAL DIMENSION: 2.8 CM
ECHO RV LONGITUDINAL DIMENSION: 6.3 CM
ECHO RV MID DIMENSION: 3 CM
ECHO RV TAPSE: 2.1 CM (ref 1.7–?)
ECHO TV REGURGITANT MAX VELOCITY: 2.83 M/S
ECHO TV REGURGITANT PEAK GRADIENT: 32 MMHG
EOSINOPHIL # BLD: 0 K/UL (ref 0.05–0.5)
EOSINOPHILS RELATIVE PERCENT: 0 % (ref 0–6)
ERYTHROCYTE [DISTWIDTH] IN BLOOD BY AUTOMATED COUNT: 12.4 % (ref 11.5–15)
GFR, ESTIMATED: >90 ML/MIN/1.73M2
GLUCOSE BLD-MCNC: 161 MG/DL (ref 74–99)
GLUCOSE BLD-MCNC: 167 MG/DL (ref 74–99)
GLUCOSE BLD-MCNC: 232 MG/DL (ref 74–99)
GLUCOSE SERPL-MCNC: 230 MG/DL (ref 74–99)
HCO3: 45.3 MMOL/L (ref 22–26)
HCT VFR BLD AUTO: 27.3 % (ref 37–54)
HGB BLD-MCNC: 8.6 G/DL (ref 12.5–16.5)
HHB: 16.3 % (ref 0–5)
IMM GRANULOCYTES # BLD AUTO: 0.03 K/UL (ref 0–0.58)
IMM GRANULOCYTES NFR BLD: 1 % (ref 0–5)
LAB: ABNORMAL
LACTATE BLDV-SCNC: 1.5 MMOL/L (ref 0.5–2.2)
LACTATE BLDV-SCNC: 1.8 MMOL/L (ref 0.5–2.2)
LYMPHOCYTES NFR BLD: 0.21 K/UL (ref 1.5–4)
LYMPHOCYTES RELATIVE PERCENT: 5 % (ref 20–42)
Lab: 525
MAGNESIUM SERPL-MCNC: 2 MG/DL (ref 1.6–2.4)
MCH RBC QN AUTO: 32.8 PG (ref 26–35)
MCHC RBC AUTO-ENTMCNC: 31.5 G/DL (ref 32–34.5)
MCV RBC AUTO: 104.2 FL (ref 80–99.9)
METHB: 0.3 % (ref 0–1.5)
MICROORGANISM SPEC CULT: NO GROWTH
MODE: ABNORMAL
MONOCYTES NFR BLD: 0.05 K/UL (ref 0.1–0.95)
MONOCYTES NFR BLD: 1 % (ref 2–12)
NEUTROPHILS NFR BLD: 93 % (ref 43–80)
NEUTS SEG NFR BLD: 4.07 K/UL (ref 1.8–7.3)
O2 CONTENT: 10.9 ML/DL
O2 SATURATION: 83.7 % (ref 92–98.5)
O2HB: 83.4 % (ref 94–97)
OPERATOR ID: 5133
PATIENT TEMP: 37 C
PCO2: 77.3 MMHG (ref 35–45)
PH BLOOD GAS: 7.39 (ref 7.35–7.45)
PHOSPHATE SERPL-MCNC: 3.5 MG/DL (ref 2.5–4.5)
PLATELET # BLD AUTO: 155 K/UL (ref 130–450)
PMV BLD AUTO: 11.1 FL (ref 7–12)
PO2: 50.1 MMHG (ref 75–100)
POTASSIUM SERPL-SCNC: 4.3 MMOL/L (ref 3.5–5.1)
RBC # BLD AUTO: 2.62 M/UL (ref 3.8–5.8)
RBC # BLD: NORMAL 10*6/UL
SERVICE CMNT-IMP: NORMAL
SODIUM SERPL-SCNC: 142 MMOL/L (ref 136–145)
SOURCE, BLOOD GAS: ABNORMAL
SPECIMEN DESCRIPTION: NORMAL
THB: 9.3 G/DL (ref 11.5–16.5)
TIME ANALYZED: 540
WBC OTHER # BLD: 4.4 K/UL (ref 4.5–11.5)

## 2025-07-17 PROCEDURE — 2700000000 HC OXYGEN THERAPY PER DAY

## 2025-07-17 PROCEDURE — 94640 AIRWAY INHALATION TREATMENT: CPT

## 2025-07-17 PROCEDURE — 82962 GLUCOSE BLOOD TEST: CPT

## 2025-07-17 PROCEDURE — 6370000000 HC RX 637 (ALT 250 FOR IP): Performed by: INTERNAL MEDICINE

## 2025-07-17 PROCEDURE — 2580000003 HC RX 258

## 2025-07-17 PROCEDURE — 6370000000 HC RX 637 (ALT 250 FOR IP): Performed by: HOSPITALIST

## 2025-07-17 PROCEDURE — 6370000000 HC RX 637 (ALT 250 FOR IP)

## 2025-07-17 PROCEDURE — 83605 ASSAY OF LACTIC ACID: CPT

## 2025-07-17 PROCEDURE — 2580000003 HC RX 258: Performed by: HOSPITALIST

## 2025-07-17 PROCEDURE — 99233 SBSQ HOSP IP/OBS HIGH 50: CPT | Performed by: INTERNAL MEDICINE

## 2025-07-17 PROCEDURE — 5A09457 ASSISTANCE WITH RESPIRATORY VENTILATION, 24-96 CONSECUTIVE HOURS, CONTINUOUS POSITIVE AIRWAY PRESSURE: ICD-10-PCS | Performed by: INTERNAL MEDICINE

## 2025-07-17 PROCEDURE — 85025 COMPLETE CBC W/AUTO DIFF WBC: CPT

## 2025-07-17 PROCEDURE — 82805 BLOOD GASES W/O2 SATURATION: CPT

## 2025-07-17 PROCEDURE — 6360000002 HC RX W HCPCS: Performed by: INTERNAL MEDICINE

## 2025-07-17 PROCEDURE — 83735 ASSAY OF MAGNESIUM: CPT

## 2025-07-17 PROCEDURE — 94660 CPAP INITIATION&MGMT: CPT

## 2025-07-17 PROCEDURE — 6360000002 HC RX W HCPCS

## 2025-07-17 PROCEDURE — 2500000003 HC RX 250 WO HCPCS: Performed by: HOSPITALIST

## 2025-07-17 PROCEDURE — 2580000003 HC RX 258: Performed by: INTERNAL MEDICINE

## 2025-07-17 PROCEDURE — 80048 BASIC METABOLIC PNL TOTAL CA: CPT

## 2025-07-17 PROCEDURE — 36415 COLL VENOUS BLD VENIPUNCTURE: CPT

## 2025-07-17 PROCEDURE — 84100 ASSAY OF PHOSPHORUS: CPT

## 2025-07-17 PROCEDURE — 2060000000 HC ICU INTERMEDIATE R&B

## 2025-07-17 PROCEDURE — 6360000002 HC RX W HCPCS: Performed by: HOSPITALIST

## 2025-07-17 PROCEDURE — 93306 TTE W/DOPPLER COMPLETE: CPT | Performed by: INTERNAL MEDICINE

## 2025-07-17 PROCEDURE — 93306 TTE W/DOPPLER COMPLETE: CPT

## 2025-07-17 RX ORDER — GLUCAGON 1 MG/ML
1 KIT INJECTION PRN
Status: DISCONTINUED | OUTPATIENT
Start: 2025-07-17 | End: 2025-07-24 | Stop reason: HOSPADM

## 2025-07-17 RX ORDER — DEXTROSE MONOHYDRATE 100 MG/ML
INJECTION, SOLUTION INTRAVENOUS CONTINUOUS PRN
Status: DISCONTINUED | OUTPATIENT
Start: 2025-07-17 | End: 2025-07-17 | Stop reason: SDUPTHER

## 2025-07-17 RX ORDER — INSULIN LISPRO 100 [IU]/ML
0-4 INJECTION, SOLUTION INTRAVENOUS; SUBCUTANEOUS
Status: DISCONTINUED | OUTPATIENT
Start: 2025-07-17 | End: 2025-07-17 | Stop reason: SDUPTHER

## 2025-07-17 RX ORDER — INSULIN LISPRO 100 [IU]/ML
0-4 INJECTION, SOLUTION INTRAVENOUS; SUBCUTANEOUS
Status: DISCONTINUED | OUTPATIENT
Start: 2025-07-17 | End: 2025-07-24 | Stop reason: HOSPADM

## 2025-07-17 RX ORDER — METHYLPREDNISOLONE SODIUM SUCCINATE 40 MG/ML
40 INJECTION INTRAMUSCULAR; INTRAVENOUS EVERY 12 HOURS
Status: DISCONTINUED | OUTPATIENT
Start: 2025-07-18 | End: 2025-07-18

## 2025-07-17 RX ORDER — DEXTROSE MONOHYDRATE 100 MG/ML
INJECTION, SOLUTION INTRAVENOUS CONTINUOUS PRN
Status: DISCONTINUED | OUTPATIENT
Start: 2025-07-17 | End: 2025-07-24 | Stop reason: HOSPADM

## 2025-07-17 RX ORDER — 0.9 % SODIUM CHLORIDE 0.9 %
500 INTRAVENOUS SOLUTION INTRAVENOUS ONCE
Status: COMPLETED | OUTPATIENT
Start: 2025-07-17 | End: 2025-07-17

## 2025-07-17 RX ORDER — METHYLPREDNISOLONE SODIUM SUCCINATE 125 MG/2ML
60 INJECTION INTRAMUSCULAR; INTRAVENOUS EVERY 6 HOURS
Status: DISCONTINUED | OUTPATIENT
Start: 2025-07-17 | End: 2025-07-17

## 2025-07-17 RX ADMIN — BUDESONIDE 500 MCG: 0.5 SUSPENSION RESPIRATORY (INHALATION) at 20:39

## 2025-07-17 RX ADMIN — MULTIVITAMIN TABLET 1 TABLET: TABLET at 09:05

## 2025-07-17 RX ADMIN — IPRATROPIUM BROMIDE AND ALBUTEROL SULFATE 1 DOSE: 2.5; .5 SOLUTION RESPIRATORY (INHALATION) at 16:23

## 2025-07-17 RX ADMIN — IPRATROPIUM BROMIDE AND ALBUTEROL SULFATE 1 DOSE: 2.5; .5 SOLUTION RESPIRATORY (INHALATION) at 20:39

## 2025-07-17 RX ADMIN — SODIUM CHLORIDE 500 ML: 0.9 INJECTION, SOLUTION INTRAVENOUS at 09:37

## 2025-07-17 RX ADMIN — SODIUM CHLORIDE, PRESERVATIVE FREE 10 ML: 5 INJECTION INTRAVENOUS at 09:05

## 2025-07-17 RX ADMIN — SODIUM CHLORIDE, PRESERVATIVE FREE 10 ML: 5 INJECTION INTRAVENOUS at 22:08

## 2025-07-17 RX ADMIN — FOLIC ACID 1 MG: 1 TABLET ORAL at 09:05

## 2025-07-17 RX ADMIN — SODIUM CHLORIDE: 0.9 INJECTION, SOLUTION INTRAVENOUS at 01:04

## 2025-07-17 RX ADMIN — SODIUM CHLORIDE: 0.9 INJECTION, SOLUTION INTRAVENOUS at 16:15

## 2025-07-17 RX ADMIN — ARFORMOTEROL TARTRATE 15 MCG: 15 SOLUTION RESPIRATORY (INHALATION) at 20:39

## 2025-07-17 RX ADMIN — METHYLPREDNISOLONE SODIUM SUCCINATE 60 MG: 125 INJECTION INTRAMUSCULAR; INTRAVENOUS at 14:35

## 2025-07-17 RX ADMIN — METHYLPREDNISOLONE SODIUM SUCCINATE 60 MG: 125 INJECTION INTRAMUSCULAR; INTRAVENOUS at 09:34

## 2025-07-17 RX ADMIN — INSULIN LISPRO 1 UNITS: 100 INJECTION, SOLUTION INTRAVENOUS; SUBCUTANEOUS at 22:10

## 2025-07-17 RX ADMIN — VANCOMYCIN HYDROCHLORIDE 1250 MG: 10 INJECTION, POWDER, LYOPHILIZED, FOR SOLUTION INTRAVENOUS at 09:23

## 2025-07-17 RX ADMIN — AZITHROMYCIN MONOHYDRATE 500 MG: 500 INJECTION, POWDER, LYOPHILIZED, FOR SOLUTION INTRAVENOUS at 11:36

## 2025-07-17 RX ADMIN — ENOXAPARIN SODIUM 30 MG: 100 INJECTION SUBCUTANEOUS at 09:05

## 2025-07-17 RX ADMIN — Medication 100 MG: at 09:05

## 2025-07-17 RX ADMIN — METHYLPREDNISOLONE SODIUM SUCCINATE 40 MG: 40 INJECTION, POWDER, LYOPHILIZED, FOR SOLUTION INTRAMUSCULAR; INTRAVENOUS at 03:34

## 2025-07-17 NOTE — PROGRESS NOTES
Pharmacy Consultation Note  (Antibiotic Dosing and Monitoring)    Initial consult date: 7/17/2025  Consulting physician/provider: Dr. Caleb Brown  Drug: Vancomycin  Indication: Bloodstream Infection    Note vancomycin discontinued. Clinical pharmacy will sign-off. Please re-consult if we can be of further assistance.    Gatito Mcclellan, PharmD, [facility]  7/17/2025  1:49 PM    CANDIDA: [phone]  SEY: [phone]  SJW: [phone]

## 2025-07-17 NOTE — PLAN OF CARE
Problem: Seizure Precautions  Goal: Remains free of injury related to seizures activity  7/17/2025 1956 by Pricila Sterling, RN  Outcome: Progressing  7/17/2025 1017 by Hilary Ibarra RN  Outcome: Progressing     Problem: Safety - Adult  Goal: Free from fall injury  7/17/2025 1956 by Pricila Sterling, RN  Outcome: Progressing  Flowsheets (Taken 7/17/2025 1530)  Free From Fall Injury: Instruct family/caregiver on patient safety  7/17/2025 1017 by Hilary Ibarra, RN  Outcome: Progressing     Problem: Nutrition Deficit:  Goal: Optimize nutritional status  Outcome: Progressing

## 2025-07-17 NOTE — PROGRESS NOTES
Physical Therapy  Facility/Department: [address] INTERMEDIATE    Name: Anthony Tam  : 1965  MRN: [id number]    Order received chart reviewed and attempted this am.  Per chart review, X-rays show multiple fractures of metatarsal bones on right foot.  Will hold PT treatment until WB clarification/recommendations have been made.  Will continue PT follow.     Juli Parkinson, PT [id number]

## 2025-07-17 NOTE — PROGRESS NOTES
Progress  Note  Anthony Tam  [id number]  1965  [id number]    Chief Complaint   Patient presents with    Other     Failure to thrive. Patient not eating, incontinent, and weak. Shortness of breath. 76 percent at pivot, room air     Historical Issues:    Principal Problem:    Acute on chronic respiratory failure with hypercapnia (HCC)  Active Problems:    Alcohol abuse    COPD exacerbation (HCC)    Gastroesophageal reflux disease    Heavy tobacco smoker    Immunoglobulin A deficiency (HCC)    Insomnia    Severe protein-calorie malnutrition    Lactic acidosis    Acute on chronic respiratory failure (HCC)  Resolved Problems:    * No resolved hospital problems. *    Current Facility-Administered Medications   Medication Dose Route Frequency Provider Last Rate Last Admin    vancomycin (VANCOCIN) 1,250 mg in sodium chloride 0.9 % 250 mL IVPB  1,250 mg IntraVENous Once Caleb Brown MD   Stopped at 07/17/25 1300    vancomycin (VANCOCIN) 1,000 mg in sodium chloride 0.9 % 250 mL (addEASE) IVPB  1,000 mg IntraVENous Q12H Caleb Brown MD        glucose chewable tablet 16 g  4 tablet Oral PRN Caleb Brown MD        dextrose bolus 10% 125 mL  125 mL IntraVENous PRN Caleb Brown MD        Or    dextrose bolus 10% 250 mL  250 mL IntraVENous PRN Caleb Brown MD        glucagon injection 1 mg  1 mg SubCUTAneous PRN Caleb Brown MD        dextrose 10 % infusion   IntraVENous Continuous PRN Caleb Brown MD        insulin lispro (HUMALOG,ADMELOG) injection vial 0-4 Units  0-4 Units SubCUTAneous 4x Daily AC & HS Caleb Brown MD        methylPREDNISolone sodium succ (SOLU-MEDROL) injection 60 mg  60 mg IntraVENous Q6H Caleb Brown MD   60 mg at 07/17/25 0934    sodium chloride 0.9 % bolus 500 mL  500 mL IntraVENous Once Caleb Brown .9 mL/hr at 07/17/25 0937 500 mL at 07/17/25 0937    0.9 % sodium chloride infusion   IntraVENous Continuous Frederic Hardy DO 75 mL/hr at 07/17/25 0104 New Bag at 07/17/25

## 2025-07-17 NOTE — CARE COORDINATION
Peer Recovery Support Note    Name: Anthony Tam  Date: 7/17/2025    Chief Complaint   Patient presents with    Other     Failure to thrive. Patient not eating, incontinent, and weak. Shortness of breath. 76 percent at pivot, room air       Peer Support met with patient.  [] Support and education provided  [] Resources provided   [] Treatment referral:   [x] Other:   [] Patient declined peer recovery services     Referred By: Felisa (SIRI)    Notes: Patient was resting and not willing to talk at this time. Peer can follow up if needed.     Signed: Elyse Maxwell, 7/17/2025

## 2025-07-17 NOTE — PROGRESS NOTES
Neptali Archer M.D.,[facility]  Ghulam Retana D.O., F.SHANNANODELVIN., [facility]  Muriel Angulo M.D.  Kristen Hess M.D.   Joe Burnett D.O.  Yonis Marques M.D.         Daily Pulmonary Progress Note    Patient:  Anthony Tam 60 y.o. male MRN: [id number]            Synopsis     We are following patient for acute on chronic hypercapnic respiratory failure secondary to AECOPD    \"CC\" failure to thrive    Code status: FULL CODE      Subjective      Patient was seen and examined at bedside this morning. Awake, alert and oriented x 3 this morning. Repeat ABG this morning showed improvement in hypercapnia, continues to be elevated. Saturating at 96% on 4L of O2 on NC.     Review of Systems:  Constitutional: Denies fever, weight loss, night sweats, and fatigue  Skin: Denies pigmentation, dark lesions, and rashes   HEENT: Denies hearing loss, tinnitus, ear drainage, epistaxis, sore throat, and hoarseness.  Cardiovascular: Denies palpitations, chest pain, and chest pressure.  Respiratory: Denies cough, dyspnea at rest, hemoptysis, apnea, and choking.  Gastrointestinal: Denies nausea, vomiting, poor appetite, diarrhea, heartburn or reflux  Genitourinary: Denies dysuria, frequency, urgency or hematuria  Musculoskeletal: Denies myalgias, muscle weakness, and bone pain  Neurological: Denies dizziness, vertigo, headache, and focal weakness  Psychological: Denies anxiety and depression  Endocrine: Denies heat intolerance and cold intolerance  Hematopoietic/Lymphatic: Denies bleeding problems and blood transfusions    24-hour events: 2/2 Blood cultures positive for gram positive cocci in clusters, LA trending down.       Objective   OBJECTIVE:   BP (!) 100/52   Pulse 92   Temp 97.3 °F (36.3 °C) (Axillary)   Resp 20   Ht 1.829 m (6')   Wt 48.5 kg (107 lb)   SpO2 98%   BMI 14.51 kg/m²   SpO2 Readings from Last 1 Encounters:   07/17/25 98%        I/O:    Intake/Output Summary (Last 24 hours) at 7/17/2025 7575  Last data filed  AM    CL 95 07/17/2025 06:25 AM    CO2 39 07/17/2025 06:25 AM    BUN 12 07/17/2025 06:25 AM    CREATININE 0.5 07/17/2025 06:25 AM    CALCIUM 8.7 07/17/2025 06:25 AM    LABGLOM >90 07/17/2025 06:25 AM     No results found for: \"PROTIME\", \"INR\"  Recent Labs     07/15/25  2316   PROBNP 314*     No results for input(s): \"TROPONINI\" in the last 72 hours.  Recent Labs     07/15/25  2316   PROCAL 0.09*     This SmartLink has not been configured with any valid records.       Micro:  No results for input(s): \"CULTRESP\" in the last 72 hours.  No results for input(s): \"LABGRAM\" in the last 72 hours.  No results for input(s): \"LEGUR\" in the last 72 hours.  No results for input(s): \"STREPNEUMAGU\" in the last 72 hours.  No results for input(s): \"LP1UAG\" in the last 72 hours.     Assessment:    Acute on chronic hypoxic and hypercapnic respiratory failure  COPD exacerbation  Pulmonary emphysema  Staph Bacteremia  Nicotine dependence  Noncompliance  Protein/calorie malnutrition, BMI 14    Plan:   Continue patient's baseline oxygen of 4 L via nasal cannula  BiPAP 12/6 at HS, daytime naps and for respiratory distress  Repeat ABG in M. Last set show compensation but significantly elevated CO2  Schedule nebulized bronchodilators-Brovana Pulmicort twice a day with DuoNebs q4h WA. albuterol nebs as needed  Azithromycin x 5 days for anti-inflammatory purposes, QTc 388  No antibiotics warranted at this time  Improving. Transitioned to IV Solu-Medrol 40 mg q12h -taper as symptoms improve  DVT/PE prophylaxis-Lovenox  Incentive spirometry  PT/OT    This plan of care was reviewed in collaboration with Dr. Burnett.    Electronically signed by Mandie House MD on 7/17/2025 at 2:47 PM     I personally saw, examined and provided care for the patient. I performed the substantive portion of the visit. Radiographs, labs and medication list were reviewed by me independently. Review of Residents documentation was conducted and revisions were made as

## 2025-07-17 NOTE — PROGRESS NOTES
Pharmacy Consultation Note  (Antibiotic Dosing and Monitoring)    Initial consult date: 7/17/2025  Consulting physician/provider: Dr. Caleb Brown  Drug: Vancomycin  Indication: Bloodstream Infection    Age/  Gender Height Weight IBW  Allergy Information   60 y.o./male 177.8 cm (5' 10\") 54.4 kg (120 lb)     Ideal body weight: 77 kg (169 lb 11.8 oz)   Patient has no known allergies.      Renal Function:  Recent Labs     07/15/25  2316 07/17/25  0625   BUN 14 12   CREATININE 0.5* 0.5*       Intake/Output Summary (Last 24 hours) at 7/17/2025 0831  Last data filed at 7/17/2025 0415  Gross per 24 hour   Intake 5 ml   Output 1350 ml   Net -1345 ml       Vancomycin Monitoring:  Trough:  No results for input(s): \"VANCOTROUGH\" in the last 72 hours.  Random:  No results for input(s): \"VANCORANDOM\" in the last 72 hours.    Vancomycin Administration Times:  Recent vancomycin administrations        No vancomycin IV orders with administrations found.            Orders not given:            vancomycin (VANCOCIN) 1,250 mg in sodium chloride 0.9 % 250 mL IVPB    vancomycin (VANCOCIN) 1,000 mg in sodium chloride 0.9 % 250 mL (addEASE) IVPB                    Assessment:  Patient is a 60 y.o. male who has been initiated on vancomycin  Estimated Creatinine Clearance: 108 mL/min (A) (based on SCr of 0.5 mg/dL (L)).  To dose vancomycin, pharmacy will be utilizing Dextr calculation software for goal AUC/CARLEE 400-600 mg/L-hr (predicted AUC/CARLEE = 480, Tr =11 mcg/mL)    Plan:  Will give loading dose of vancomycin 1250 mg IV x 1 dose and then will continue vancomycin 1000 mg IV every 12 hours  Will check vancomycin levels when appropriate  Will continue to monitor renal function   Pharmacy to follow    Gatito Mcclellan, PharmD, [facility]  7/17/2025  8:32 AM    CANDIDA: [phone]  SEY: [phone]  SJW: [phone]

## 2025-07-17 NOTE — PLAN OF CARE
Problem: Seizure Precautions  Goal: Remains free of injury related to seizures activity  7/17/2025 0006 by Aruna Barba RN  Outcome: Progressing  7/16/2025 1139 by Hilary Ibarra RN  Outcome: Progressing     Problem: Safety - Adult  Goal: Free from fall injury  7/17/2025 0006 by Aruna Barba RN  Outcome: Progressing  7/16/2025 1139 by Hilary Ibarra, RN  Outcome: Progressing

## 2025-07-17 NOTE — ACP (ADVANCE CARE PLANNING)
Advance Care Planning   Healthcare Decision Maker:      Click here to complete Healthcare Decision Makers including selection of the Healthcare Decision Maker Relationship (ie \"Primary\").  Today we documented Decision Maker(s) consistent with Legal Next of Kin hierarchy.

## 2025-07-17 NOTE — CARE COORDINATION
Social Work / Discharge Planning : Patient admitted with Non-Compliance.SW met with patient and explained role as discharge planner/ transition of care. Patient verified plan at discharge is HOME. Patient [facility] and [facility]  [facility]. CIWA scale initiated and referral made to Peer Recovery. Pulmonology and ID consulted and await treatment plan and recommendations. Therapy is ordered and await therapy input to better assist with discharge planning. Patient verified he ahs transportation home. SW to follow. Electronically signed by STEVE Bourgeois on 7/17/25 at 12:55 PM EDT

## 2025-07-17 NOTE — CONSULTS
[facility]  [facility]  Consultation Note     Admit Date: 7/15/2025 10:47 PM    Reason for Consult:   Bacteremia    Attending Physician:  Caleb Brown MD    HISTORY OF PRESENT ILLNESS:             The history is obtained from extensive review of available past medical records. The patient is a 60 y.o. male who is not known to the ID service.   Patient presented to [facility] due to altered mental status.  Per chart review, patient was found by his daughter with urinary and fecal incontinence.  Patient is also supposed to wear 4 L nasal cannula but has not been compliant with treatment.  Patient has been suffering multiple falls and daughter was concerned about his deterioration.  He is complaining of right foot pain and per x-ray, patient has multiple fractures of metatarsal bones.  He denies any fevers, chills, night sweats.  Patient has been afebrile since admission.  Currently on 4 L nasal cannula which is his baseline.  WBC within normal limits.  Procalcitonin 0.09.  Blood cultures have turned positive for Staph species.  ID was consulted for further recommendations.    Past Medical History:    No past medical history on file.  Past Surgical History:    No past surgical history on file.  Current Medications:   Scheduled Meds:   vancomycin (VANCOCIN) 1,250 mg in sodium chloride 0.9 % 250 mL IVPB  1,250 mg IntraVENous Once    vancomycin  1,000 mg IntraVENous Q12H    insulin lispro  0-4 Units SubCUTAneous 4x Daily AC & HS    methylPREDNISolone  60 mg IntraVENous Q6H    enoxaparin  30 mg SubCUTAneous Daily    metoprolol succinate  25 mg Oral Daily    sodium chloride flush  5-40 mL IntraVENous 2 times per day    multivitamin  1 tablet Oral Daily    folic acid  1 mg Oral Daily    thiamine  100 mg Oral Daily    azithromycin  500 mg IntraVENous Q24H    ipratropium 0.5 mg-albuterol 2.5 mg  1 Dose Inhalation Q4H WA RT    arformoterol tartrate  15 mcg Nebulization BID RT

## 2025-07-17 NOTE — PROGRESS NOTES
Spiritual Health History and Assessment/Progress Note  [facility]    Initial Encounter,  ,  ,      Name: Anthony Tam MRN: [id number]    Age: 60 y.o.     Sex: male   Language: English   Caodaism: No Hindu on file   Acute on chronic respiratory failure with hypercapnia (HCC)     Date: 7/17/2025                           Spiritual Assessment began in [facility] 6S INTERMEDIATE        Referral/Consult From: Rounding   Encounter Overview/Reason: Initial Encounter  Service Provided For: Patient    Maricarmen, Belief, Meaning:   Patient identifies as spiritual  Family/Friends No family/friends present      Importance and Influence:  Patient unable to assess at this time  Family/Friends No family/friends present    Community:  Patient feels well-supported. Support system includes: Friends  Family/Friends No family/friends present    Assessment and Plan of Care:     Patient Interventions include: Other: Words of support and reassurance were shared, as patient is tired and sleepy  Family/Friends Interventions include: No family/friends present    Patient Plan of Care: Spiritual Care available upon further referral  Family/Friends Plan of Care: No family/friends present    Electronically signed by Chaplain Clementina on 7/17/2025 at 11:23 AM

## 2025-07-17 NOTE — PROGRESS NOTES
Comprehensive Nutrition Assessment    Type and Reason for Visit:  Initial, Consult (Unintentional Weight Loss)    Nutrition Recommendations/Plan:   Continue current diet.  Continue STD ONS TID.     Malnutrition Assessment:  Malnutrition Status:  Severe malnutrition (07/17/25 1631)    Context:  Chronic Illness     Findings of the 6 clinical characteristics of malnutrition:  Energy Intake:  Mild decrease in energy intake (suspect non protein calories)  Weight Loss:  No weight loss (has gained some wt but still below IBW for ht/age.)     Body Fat Loss:  Severe body fat loss Triceps   Muscle Mass Loss:  Severe muscle mass loss Clavicles (pectoralis & deltoids), Scapula (trapezius)  Fluid Accumulation:  No fluid accumulation     Strength:  Not Performed    Nutrition Assessment:    ADM: Acute on Chronic Resp failure. PMH: ETOH, COPD, Dysphagia, GERD, + Tobacco. R foot pain and per x-ray, patient has multiple fractures of metatarsal bones. Pt very slight build. States he drinks Ensure at home. Will send TID w/meals per pt preference to optimize intake.    Nutrition Related Findings:      Wound Type: None       Current Nutrition Intake & Therapies:    Average Meal Intake: 51-75%, 26-50%  Average Supplements Intake: None Ordered  ADULT DIET; Regular  ADULT ORAL NUTRITION SUPPLEMENT; Dinner, Lunch, Breakfast; Standard High Calorie/High Protein Oral Supplement    Anthropometric Measures:  Height: 182.9 cm (6')  Ideal Body Weight (IBW): 178 lbs (81 kg)    Admission Body Weight:  (107# stated on adm 7/15.)  Current Body Weight: 52.2 kg (115 lb 1.3 oz), 64.7 % IBW. Weight Source: Bed scale (7/17)  Current BMI (kg/m2): 15.6  Usual Body Weight: 49.7 kg (109 lb 8 oz) (11/21/24)     % Weight Change (Calculated): 5.1  Weight Adjustment For: No Adjustment                 BMI Categories: Underweight (BMI less than 18.5)    Estimated Daily Nutrient Needs:  Energy Requirements Based On: Kcal/kg  Weight Used for Energy Requirements:

## 2025-07-17 NOTE — PROGRESS NOTES
Dr. Brown notified via [employer] regarding order clarification for lactic acid labs and positive blood cultures.

## 2025-07-18 LAB
ACB COMPLEX DNA BLD POS QL NAA+NON-PROBE: NOT DETECTED
ANION GAP SERPL CALCULATED.3IONS-SCNC: 8 MMOL/L (ref 7–16)
B FRAGILIS DNA BLD POS QL NAA+NON-PROBE: NOT DETECTED
BASOPHILS # BLD: 0.01 K/UL (ref 0–0.2)
BASOPHILS NFR BLD: 0 % (ref 0–2)
BIOFIRE TEST COMMENT: ABNORMAL
BUN SERPL-MCNC: 15 MG/DL (ref 8–23)
C ALBICANS DNA BLD POS QL NAA+NON-PROBE: NOT DETECTED
C AURIS DNA BLD POS QL NAA+NON-PROBE: NOT DETECTED
C GATTII+NEOFOR DNA BLD POS QL NAA+N-PRB: NOT DETECTED
C GLABRATA DNA BLD POS QL NAA+NON-PROBE: NOT DETECTED
C KRUSEI DNA BLD POS QL NAA+NON-PROBE: NOT DETECTED
C PARAP DNA BLD POS QL NAA+NON-PROBE: NOT DETECTED
C TROPICLS DNA BLD POS QL NAA+NON-PROBE: NOT DETECTED
CALCIUM SERPL-MCNC: 8.9 MG/DL (ref 8.8–10.2)
CHLORIDE SERPL-SCNC: 97 MMOL/L (ref 98–107)
CO2 SERPL-SCNC: 38 MMOL/L (ref 22–29)
CREAT SERPL-MCNC: 0.5 MG/DL (ref 0.7–1.2)
CRP SERPL HS-MCNC: 3.8 MG/L (ref 0–5)
E CLOAC COMP DNA BLD POS NAA+NON-PROBE: NOT DETECTED
E COLI DNA BLD POS QL NAA+NON-PROBE: NOT DETECTED
E FAECALIS DNA BLD POS QL NAA+NON-PROBE: NOT DETECTED
E FAECIUM DNA BLD POS QL NAA+NON-PROBE: NOT DETECTED
EKG ATRIAL RATE: 111 BPM
EKG P AXIS: 81 DEGREES
EKG P-R INTERVAL: 126 MS
EKG Q-T INTERVAL: 286 MS
EKG QRS DURATION: 86 MS
EKG QTC CALCULATION (BAZETT): 388 MS
EKG R AXIS: 83 DEGREES
EKG T AXIS: 85 DEGREES
EKG VENTRICULAR RATE: 111 BPM
ENTEROBACTERALES DNA BLD POS NAA+N-PRB: NOT DETECTED
EOSINOPHIL # BLD: 0 K/UL (ref 0.05–0.5)
EOSINOPHILS RELATIVE PERCENT: 0 % (ref 0–6)
ERYTHROCYTE [DISTWIDTH] IN BLOOD BY AUTOMATED COUNT: 12.4 % (ref 11.5–15)
ERYTHROCYTE [SEDIMENTATION RATE] IN BLOOD BY WESTERGREN METHOD: 16 MM/HR (ref 0–15)
GFR, ESTIMATED: >90 ML/MIN/1.73M2
GLUCOSE BLD-MCNC: 126 MG/DL (ref 74–99)
GLUCOSE BLD-MCNC: 178 MG/DL (ref 74–99)
GLUCOSE BLD-MCNC: 247 MG/DL (ref 74–99)
GLUCOSE BLD-MCNC: 287 MG/DL (ref 74–99)
GLUCOSE SERPL-MCNC: 91 MG/DL (ref 74–99)
GP B STREP DNA BLD POS QL NAA+NON-PROBE: NOT DETECTED
HAEM INFLU DNA BLD POS QL NAA+NON-PROBE: NOT DETECTED
HBA1C MFR BLD: 5.5 % (ref 4–5.6)
HCT VFR BLD AUTO: 26.6 % (ref 37–54)
HGB BLD-MCNC: 8.3 G/DL (ref 12.5–16.5)
IMM GRANULOCYTES # BLD AUTO: <0.03 K/UL (ref 0–0.58)
IMM GRANULOCYTES NFR BLD: 0 % (ref 0–5)
K OXYTOCA DNA BLD POS QL NAA+NON-PROBE: NOT DETECTED
KLEBSIELLA SP DNA BLD POS QL NAA+NON-PRB: NOT DETECTED
KLEBSIELLA SP DNA BLD POS QL NAA+NON-PRB: NOT DETECTED
L MONOCYTOG DNA BLD POS QL NAA+NON-PROBE: NOT DETECTED
LYMPHOCYTES NFR BLD: 0.72 K/UL (ref 1.5–4)
LYMPHOCYTES RELATIVE PERCENT: 14 % (ref 20–42)
MCH RBC QN AUTO: 32.2 PG (ref 26–35)
MCHC RBC AUTO-ENTMCNC: 31.2 G/DL (ref 32–34.5)
MCV RBC AUTO: 103.1 FL (ref 80–99.9)
MICROORGANISM SPEC CULT: ABNORMAL
MICROORGANISM/AGENT SPEC: ABNORMAL
MONOCYTES NFR BLD: 0.54 K/UL (ref 0.1–0.95)
MONOCYTES NFR BLD: 10 % (ref 2–12)
N MEN DNA BLD POS QL NAA+NON-PROBE: NOT DETECTED
NEUTROPHILS NFR BLD: 75 % (ref 43–80)
NEUTS SEG NFR BLD: 3.96 K/UL (ref 1.8–7.3)
P AERUGINOSA DNA BLD POS NAA+NON-PROBE: NOT DETECTED
PLATELET # BLD AUTO: 163 K/UL (ref 130–450)
PMV BLD AUTO: 11.1 FL (ref 7–12)
POTASSIUM SERPL-SCNC: 4.2 MMOL/L (ref 3.5–5.1)
PROTEUS SP DNA BLD POS QL NAA+NON-PROBE: NOT DETECTED
RBC # BLD AUTO: 2.58 M/UL (ref 3.8–5.8)
S AUREUS DNA BLD POS QL NAA+NON-PROBE: NOT DETECTED
S AUREUS+CONS DNA BLD POS NAA+NON-PROBE: DETECTED
S EPIDERMIDIS DNA BLD POS QL NAA+NON-PRB: NOT DETECTED
S LUGDUNENSIS DNA BLD POS QL NAA+NON-PRB: NOT DETECTED
S MALTOPHILIA DNA BLD POS QL NAA+NON-PRB: NOT DETECTED
S MARCESCENS DNA BLD POS NAA+NON-PROBE: NOT DETECTED
S PNEUM DNA BLD POS QL NAA+NON-PROBE: NOT DETECTED
S PYO DNA BLD POS QL NAA+NON-PROBE: NOT DETECTED
SALMONELLA DNA BLD POS QL NAA+NON-PROBE: NOT DETECTED
SERVICE CMNT-IMP: ABNORMAL
SODIUM SERPL-SCNC: 143 MMOL/L (ref 136–145)
SPECIMEN DESCRIPTION: ABNORMAL
STREPTOCOCCUS DNA BLD POS NAA+NON-PROBE: NOT DETECTED
WBC OTHER # BLD: 5.3 K/UL (ref 4.5–11.5)

## 2025-07-18 PROCEDURE — 85652 RBC SED RATE AUTOMATED: CPT

## 2025-07-18 PROCEDURE — 82962 GLUCOSE BLOOD TEST: CPT

## 2025-07-18 PROCEDURE — 6360000002 HC RX W HCPCS: Performed by: NURSE PRACTITIONER

## 2025-07-18 PROCEDURE — 6360000002 HC RX W HCPCS

## 2025-07-18 PROCEDURE — 83036 HEMOGLOBIN GLYCOSYLATED A1C: CPT

## 2025-07-18 PROCEDURE — 6370000000 HC RX 637 (ALT 250 FOR IP)

## 2025-07-18 PROCEDURE — 80048 BASIC METABOLIC PNL TOTAL CA: CPT

## 2025-07-18 PROCEDURE — 2580000003 HC RX 258: Performed by: HOSPITALIST

## 2025-07-18 PROCEDURE — 2500000003 HC RX 250 WO HCPCS: Performed by: HOSPITALIST

## 2025-07-18 PROCEDURE — 6360000002 HC RX W HCPCS: Performed by: HOSPITALIST

## 2025-07-18 PROCEDURE — 86140 C-REACTIVE PROTEIN: CPT

## 2025-07-18 PROCEDURE — 2580000003 HC RX 258

## 2025-07-18 PROCEDURE — 2700000000 HC OXYGEN THERAPY PER DAY

## 2025-07-18 PROCEDURE — 6370000000 HC RX 637 (ALT 250 FOR IP): Performed by: HOSPITALIST

## 2025-07-18 PROCEDURE — 6370000000 HC RX 637 (ALT 250 FOR IP): Performed by: INTERNAL MEDICINE

## 2025-07-18 PROCEDURE — 85025 COMPLETE CBC W/AUTO DIFF WBC: CPT

## 2025-07-18 PROCEDURE — 94640 AIRWAY INHALATION TREATMENT: CPT

## 2025-07-18 PROCEDURE — 93010 ELECTROCARDIOGRAM REPORT: CPT | Performed by: INTERNAL MEDICINE

## 2025-07-18 PROCEDURE — 2060000000 HC ICU INTERMEDIATE R&B

## 2025-07-18 PROCEDURE — 99232 SBSQ HOSP IP/OBS MODERATE 35: CPT | Performed by: INTERNAL MEDICINE

## 2025-07-18 PROCEDURE — 94660 CPAP INITIATION&MGMT: CPT

## 2025-07-18 RX ORDER — PREDNISONE 20 MG/1
20 TABLET ORAL DAILY
Status: DISCONTINUED | OUTPATIENT
Start: 2025-07-25 | End: 2025-07-24 | Stop reason: HOSPADM

## 2025-07-18 RX ORDER — PREDNISONE 20 MG/1
40 TABLET ORAL DAILY
Status: DISPENSED | OUTPATIENT
Start: 2025-07-19 | End: 2025-07-22

## 2025-07-18 RX ORDER — DIAZEPAM 10 MG/2ML
5 INJECTION, SOLUTION INTRAMUSCULAR; INTRAVENOUS ONCE
Status: COMPLETED | OUTPATIENT
Start: 2025-07-18 | End: 2025-07-18

## 2025-07-18 RX ORDER — PREDNISONE 5 MG/1
10 TABLET ORAL DAILY
Status: DISCONTINUED | OUTPATIENT
Start: 2025-07-28 | End: 2025-07-24 | Stop reason: HOSPADM

## 2025-07-18 RX ADMIN — SODIUM CHLORIDE, PRESERVATIVE FREE 10 ML: 5 INJECTION INTRAVENOUS at 22:00

## 2025-07-18 RX ADMIN — BUDESONIDE 500 MCG: 0.5 SUSPENSION RESPIRATORY (INHALATION) at 07:58

## 2025-07-18 RX ADMIN — LORAZEPAM 2 MG: 1 TABLET ORAL at 13:54

## 2025-07-18 RX ADMIN — FOLIC ACID 1 MG: 1 TABLET ORAL at 07:51

## 2025-07-18 RX ADMIN — ACETAMINOPHEN 650 MG: 325 TABLET ORAL at 07:51

## 2025-07-18 RX ADMIN — MULTIVITAMIN TABLET 1 TABLET: TABLET at 07:51

## 2025-07-18 RX ADMIN — INSULIN LISPRO 2 UNITS: 100 INJECTION, SOLUTION INTRAVENOUS; SUBCUTANEOUS at 11:09

## 2025-07-18 RX ADMIN — SODIUM CHLORIDE: 0.9 INJECTION, SOLUTION INTRAVENOUS at 09:02

## 2025-07-18 RX ADMIN — METHYLPREDNISOLONE SODIUM SUCCINATE 40 MG: 40 INJECTION, POWDER, LYOPHILIZED, FOR SOLUTION INTRAMUSCULAR; INTRAVENOUS at 13:54

## 2025-07-18 RX ADMIN — Medication 100 MG: at 07:51

## 2025-07-18 RX ADMIN — ENOXAPARIN SODIUM 30 MG: 100 INJECTION SUBCUTANEOUS at 07:51

## 2025-07-18 RX ADMIN — DIAZEPAM 5 MG: 5 INJECTION, SOLUTION INTRAMUSCULAR; INTRAVENOUS at 22:40

## 2025-07-18 RX ADMIN — LORAZEPAM 2 MG: 1 TABLET ORAL at 20:01

## 2025-07-18 RX ADMIN — IPRATROPIUM BROMIDE AND ALBUTEROL SULFATE 1 DOSE: 2.5; .5 SOLUTION RESPIRATORY (INHALATION) at 07:58

## 2025-07-18 RX ADMIN — IPRATROPIUM BROMIDE AND ALBUTEROL SULFATE 1 DOSE: 2.5; .5 SOLUTION RESPIRATORY (INHALATION) at 12:19

## 2025-07-18 RX ADMIN — INSULIN LISPRO 1 UNITS: 100 INJECTION, SOLUTION INTRAVENOUS; SUBCUTANEOUS at 20:05

## 2025-07-18 RX ADMIN — IPRATROPIUM BROMIDE AND ALBUTEROL SULFATE 1 DOSE: 2.5; .5 SOLUTION RESPIRATORY (INHALATION) at 16:38

## 2025-07-18 RX ADMIN — METHYLPREDNISOLONE SODIUM SUCCINATE 40 MG: 40 INJECTION, POWDER, LYOPHILIZED, FOR SOLUTION INTRAMUSCULAR; INTRAVENOUS at 03:13

## 2025-07-18 RX ADMIN — METOPROLOL SUCCINATE 25 MG: 25 TABLET, EXTENDED RELEASE ORAL at 07:51

## 2025-07-18 RX ADMIN — ARFORMOTEROL TARTRATE 15 MCG: 15 SOLUTION RESPIRATORY (INHALATION) at 07:58

## 2025-07-18 RX ADMIN — AZITHROMYCIN MONOHYDRATE 500 MG: 500 INJECTION, POWDER, LYOPHILIZED, FOR SOLUTION INTRAVENOUS at 12:35

## 2025-07-18 ASSESSMENT — PAIN SCALES - GENERAL
PAINLEVEL_OUTOF10: 0
PAINLEVEL_OUTOF10: 8

## 2025-07-18 ASSESSMENT — PAIN DESCRIPTION - DESCRIPTORS: DESCRIPTORS: ACHING;STABBING

## 2025-07-18 ASSESSMENT — PAIN DESCRIPTION - ORIENTATION: ORIENTATION: RIGHT

## 2025-07-18 ASSESSMENT — PAIN DESCRIPTION - LOCATION: LOCATION: FOOT

## 2025-07-18 ASSESSMENT — PAIN - FUNCTIONAL ASSESSMENT: PAIN_FUNCTIONAL_ASSESSMENT: ACTIVITIES ARE NOT PREVENTED

## 2025-07-18 NOTE — PLAN OF CARE
Problem: Seizure Precautions  Goal: Remains free of injury related to seizures activity  7/18/2025 1157 by Hardik Leone RN  Outcome: Progressing  7/18/2025 0307 by Ella Carranza RN  Outcome: Progressing     Problem: Safety - Adult  Goal: Free from fall injury  7/18/2025 1157 by Hardik Leone RN  Outcome: Progressing  7/18/2025 0307 by Ella Carranza RN  Outcome: Progressing  Flowsheets (Taken 7/18/2025 0000)  Free From Fall Injury: Instruct family/caregiver on patient safety     Problem: Nutrition Deficit:  Goal: Optimize nutritional status  7/18/2025 1157 by Hardik Leone RN  Outcome: Progressing  7/18/2025 0307 by Ella Carranza RN  Outcome: Progressing

## 2025-07-18 NOTE — PROGRESS NOTES
Neptali Archer M.D.,[facility]  Ghulam Retana D.O., IRENE., [facility]  Muriel Angulo M.D.  Kristen Hess M.D.   Joe Burnett D.O.  Yonis Marques M.D.         Daily Pulmonary Progress Note    Patient:  Anthony Tam 60 y.o. male MRN: [id number]            Synopsis     We are following patient for acute on chronic hypercapnic respiratory failure secondary to AECOPD    \"CC\" failure to thrive    Code status: FULL CODE      Subjective   7/16/25: Clifford was seen today sitting up in bed on his baseline oxygen of 4 L. He is awake, alert and oriented. He is extremely thin and cachectic. He tells me his father recently passed away and at the 1 month anniversary he became extremely depressed and quit taking all of his medications. He states that he understands that he messed up. He reports that his COPD typically does not cause him many exacerbations. His lungs on exam are tight and wheezy.     7/17/25: Patient was seen and examined at bedside this morning. Awake, alert and oriented x 3 this morning. Repeat ABG this morning showed improvement in hypercapnia, continues to be elevated. Saturating at 96% on 4L of O2 on NC.     7/18/25: Patient seen and examined lying in bed on his baseline of 4 L.  Lungs are still wheezy but are improved nicely.  He is complaining of foot pain.    Review of Systems:  Constitutional: Denies fever, weight loss, night sweats, and fatigue  Skin: Denies pigmentation, dark lesions, and rashes   HEENT: Denies hearing loss, tinnitus, ear drainage, epistaxis, sore throat, and hoarseness.  Cardiovascular: Denies palpitations, chest pain, and chest pressure.  Respiratory: Denies cough, dyspnea at rest, hemoptysis, apnea, and choking.  Gastrointestinal: Denies nausea, vomiting, poor appetite, diarrhea, heartburn or reflux  Genitourinary: Denies dysuria, frequency, urgency or hematuria  Musculoskeletal: Right foot pain  Neurological: Denies dizziness, vertigo, headache, and focal

## 2025-07-18 NOTE — PROGRESS NOTES
[facility]  [facility]  Progress Note    SUBJECTIVE:  Chief Complaint   Patient presents with    Other     Failure to thrive. Patient not eating, incontinent, and weak. Shortness of breath. 76 percent at pivot, room air     Patient is tolerating medications. No reported adverse drug reactions.  No nausea, vomiting, diarrhea.  Lying in bed.  Denies any complaints.  4 L nasal cannula  No fevers    Review of systems:  As stated above in the chief complaint, otherwise negative.    Medications:  Scheduled Meds:   insulin lispro  0-4 Units SubCUTAneous 4x Daily AC & HS    methylPREDNISolone  40 mg IntraVENous Q12H    enoxaparin  30 mg SubCUTAneous Daily    metoprolol succinate  25 mg Oral Daily    sodium chloride flush  5-40 mL IntraVENous 2 times per day    multivitamin  1 tablet Oral Daily    folic acid  1 mg Oral Daily    thiamine  100 mg Oral Daily    azithromycin  500 mg IntraVENous Q24H    ipratropium 0.5 mg-albuterol 2.5 mg  1 Dose Inhalation Q4H WA RT    arformoterol tartrate  15 mcg Nebulization BID RT    budesonide  0.5 mg Nebulization BID RT     Continuous Infusions:   dextrose      sodium chloride       PRN Meds:glucagon (rDNA), glucose, dextrose bolus **OR** dextrose bolus, dextrose, ondansetron **OR** ondansetron, polyethylene glycol, acetaminophen **OR** acetaminophen, benzonatate, sodium chloride flush, sodium chloride, LORazepam **OR** [DISCONTINUED] LORazepam **OR** LORazepam **OR** [DISCONTINUED] LORazepam **OR** LORazepam **OR** [DISCONTINUED] LORazepam **OR** LORazepam **OR** [DISCONTINUED] LORazepam, albuterol, melatonin    OBJECTIVE:  /65   Pulse 100   Temp 97.2 °F (36.2 °C) (Oral)   Resp 20   Ht 1.829 m (6')   Wt 54 kg (119 lb 0.8 oz)   SpO2 100%   BMI 16.15 kg/m²   Temp  Av °F (36.7 °C)  Min: 97.2 °F (36.2 °C)  Max: 98.8 °F (37.1 °C)  Constitutional: The patient is awake, alert.  Lying in bed denies any complaints.   Skin: Warm and dry. No rashes

## 2025-07-18 NOTE — PLAN OF CARE
Problem: Seizure Precautions  Goal: Remains free of injury related to seizures activity  7/18/2025 0307 by Ella Carranza RN  Outcome: Progressing  7/17/2025 1956 by Pricila Sterling RN  Outcome: Progressing     Problem: Safety - Adult  Goal: Free from fall injury  7/18/2025 0307 by Ella Carranza RN  Outcome: Progressing  Flowsheets (Taken 7/18/2025 0000)  Free From Fall Injury: Instruct family/caregiver on patient safety  7/17/2025 1956 by Pricila Sterling RN  Outcome: Progressing  Flowsheets (Taken 7/17/2025 1530)  Free From Fall Injury: Instruct family/caregiver on patient safety     Problem: Nutrition Deficit:  Goal: Optimize nutritional status  7/18/2025 0307 by Ella Carranza RN  Outcome: Progressing  7/17/2025 1956 by Pricila Sterling RN  Outcome: Progressing

## 2025-07-18 NOTE — PROGRESS NOTES
Progress  Note  Anthony Tam  [id number]  1965  [id number]    Chief Complaint   Patient presents with    Other     Failure to thrive. Patient not eating, incontinent, and weak. Shortness of breath. 76 percent at pivot, room air     Historical Issues:    Principal Problem:    Acute on chronic respiratory failure with hypercapnia (HCC)  Active Problems:    Alcohol abuse    COPD exacerbation (HCC)    Gastroesophageal reflux disease    Heavy tobacco smoker    Immunoglobulin A deficiency (HCC)    Insomnia    Severe protein-energy malnutrition    Lactic acidosis    Acute on chronic respiratory failure (HCC)  Resolved Problems:    * No resolved hospital problems. *    Current Facility-Administered Medications   Medication Dose Route Frequency Provider Last Rate Last Admin    glucagon injection 1 mg  1 mg SubCUTAneous PRN Caleb Brown MD        glucose chewable tablet 16 g  4 tablet Oral PRN Caleb Brown MD        dextrose bolus 10% 125 mL  125 mL IntraVENous PRN Caleb Brown MD        Or    dextrose bolus 10% 250 mL  250 mL IntraVENous PRCaleb Alves MD        dextrose 10 % infusion   IntraVENous Continuous PRN Caleb Brown MD        insulin lispro (HUMALOG,ADMELOG) injection vial 0-4 Units  0-4 Units SubCUTAneous 4x Daily AC & HS Caleb Brown MD   2 Units at 07/18/25 1109    methylPREDNISolone sodium succ (SOLU-MEDROL) injection 40 mg  40 mg IntraVENous Q12H Mandie House MD   40 mg at 07/18/25 0313    ondansetron (ZOFRAN-ODT) disintegrating tablet 4 mg  4 mg Oral Q8H PRN Frederic Hardy DO        Or    ondansetron (ZOFRAN) injection 4 mg  4 mg IntraVENous Q6H PRN Frederic Hardy DO        polyethylene glycol (GLYCOLAX) packet 17 g  17 g Oral Daily PRN Frederic Hardy DO        enoxaparin Sodium (LOVENOX) injection 30 mg  30 mg SubCUTAneous Daily Frederic Hardy DO   30 mg at 07/18/25 0751    acetaminophen (TYLENOL) tablet 650 mg  650 mg Oral Q6H PRN Frederic Hardy DO   650 mg at 07/18/25 0751      3 mg Oral Nightly PRN Romeo Plaza DO                   Recent Complaints:  Review of Systems  Vitals:    07/18/25 1054   BP: 125/65   Pulse: 100   Resp: 20   Temp: 97.2 °F (36.2 °C)   SpO2: 100%     Physical Exam  Vitals reviewed.   Constitutional:       General: He is not in acute distress.     Appearance: He is ill-appearing.   Cardiovascular:      Rate and Rhythm: Normal rate.   Pulmonary:      Effort: Tachypnea and prolonged expiration present.      Breath sounds: Wheezing present.   Musculoskeletal:         General: Swelling and signs of injury present.      Right lower leg: Edema present.   Neurological:      Mental Status: He is alert.         Recent Labs     07/15/25  2316 07/17/25  0625 07/18/25  0335    142 143   K 4.7 4.3 4.2   CL 86* 95* 97*   CO2 44* 39* 38*   BUN 14 12 15   CREATININE 0.5* 0.5* 0.5*   GLUCOSE 143* 230* 91   CALCIUM 9.7 8.7* 8.9     Recent Labs     07/15/25  2316 07/17/25  0625 07/18/25  0335   WBC 5.6 4.4* 5.3   RBC 3.31* 2.62* 2.58*   HGB 10.7* 8.6* 8.3*   HCT 34.4* 27.3* 26.6*   .9* 104.2* 103.1*   MCH 32.3 32.8 32.2   MCHC 31.1* 31.5* 31.2*   RDW 12.3 12.4 12.4    155 163   MPV 11.2 11.1 11.1               Assessment/Plan:  Acute on Chronic Hypercapnic Hypoxemic Respiratory failure: POA,   on 4L nasal cannula ATC and NIPPV nightly at home. Consult to Pulmonary medicine  COPD exacerbation: POA, 2/2 to noncompliance with medications,   scheduled duo-nebs q4hr while awake & prn.   IV Solu-medrol Taper.    Tessalon Perles 100 mg q8hr prn.   IV abx.  Lactic Acidosis: initial lactate 4.5, repeat 3.7. RESOLVED  History of ETOH use disorder:   CIWA driven IV ativan, IV thiamine x 1 then  mg daily, MVI and folate  Tobacco Use Disorder: previously smoked over 2 ppd. Down to 2 cigarettes a day.  on cessation.   Moderate Protein Calorie Malnutrition: POA,   BMI is 14.51 with recent weight loss, loss of deltoid, temporalis and interosseous musculature on exam

## 2025-07-18 NOTE — CONSULTS
Department of Podiatry   Consult Note        Reason for Consult: Multiple foot fractures    CHIEF COMPLAINT: Acute on chronic respiratory failure with hypercapnia    HISTORY OF PRESENT ILLNESS:    Anthony Tam is a 60 y.o. male with significant past medical history of COPD, prediabetes, HTN, GERD, and multiple falls.  He states he was walking up the stairs and he fell a few days ago.  He states that he fell down the stairs as well and his foot began shaking when he landed at the bottom of the stairs.  Reports that his feet are basically numb, and wonders if the feeling will ever come back.  He reports pain on palpation as well as pain in general.  Has no other podiatric complaints at this time.  Patient denies any N/V/D/F/C/SOB/CP and has no other pedal complaints at this time.     Past Medical History:    No past medical history on file.    Past Surgical History:    No past surgical history on file.    Medications Prior to Admission:    Medications Prior to Admission: metoprolol succinate (TOPROL XL) 50 MG extended release tablet, Take 0.5 tablets by mouth every morning  albuterol sulfate HFA (VENTOLIN HFA) 108 (90 Base) MCG/ACT inhaler, Inhale 2 puffs into the lungs 4 times daily as needed for Wheezing or Shortness of Breath  albuterol (PROVENTIL) (2.5 MG/3ML) 0.083% nebulizer solution, Take 3 mLs by nebulization 4 times daily as needed for Wheezing or Shortness of Breath  Nutritional Supplements (ENSURE PLUS) LIQD, Take 1 Canister by mouth 4 times daily *STRAWBERRY*  ferrous sulfate (IRON 325) 325 (65 Fe) MG tablet, Take 1 tablet by mouth every morning  folic acid (FOLVITE) 1 MG tablet, Take 1 tablet by mouth every morning  melatonin 3 MG TABS tablet, Take 3 tablets by mouth nightly as needed (INSOMNIA)  mometasone-formoterol (DULERA) 200-5 MCG/ACT inhaler, Inhale 2 puffs into the lungs in the morning and 2 puffs in the evening.  Multiple Vitamins-Minerals (CENTRUM SILVER 50+MEN) TABS, Take 1 tablet by mouth

## 2025-07-18 NOTE — CARE COORDINATION
Social Work / Discharge Planning :Podiatry evaluated and NWB to rt leg. AWaiting therapy input to better assist with d/c planning. Peer saw but patient did not want to discuss. Patient states he [facility]. Currently on 4 liters at 100% . Patient does NOT want SNF nor HHC and stated plan is HOME.[facility] is [facility].AWait plan. SW to follow. Electronically signed by STEVE Bourgeois on 7/18/25 at 11:02 AM EDT

## 2025-07-19 LAB
ANION GAP SERPL CALCULATED.3IONS-SCNC: 4 MMOL/L (ref 7–16)
B.E.: 15.9 MMOL/L (ref -3–3)
BASOPHILS # BLD: 0.01 K/UL (ref 0–0.2)
BASOPHILS NFR BLD: 0 % (ref 0–2)
BUN SERPL-MCNC: 14 MG/DL (ref 8–23)
CALCIUM SERPL-MCNC: 9.1 MG/DL (ref 8.8–10.2)
CHLORIDE SERPL-SCNC: 100 MMOL/L (ref 98–107)
CO2 SERPL-SCNC: 42 MMOL/L (ref 22–29)
COHB: 0.1 % (ref 0–1.5)
COMMENT: ABNORMAL
CREAT SERPL-MCNC: 0.5 MG/DL (ref 0.7–1.2)
CRITICAL: ABNORMAL
DATE ANALYZED: ABNORMAL
DATE OF COLLECTION: ABNORMAL
EOSINOPHIL # BLD: 0 K/UL (ref 0.05–0.5)
EOSINOPHILS RELATIVE PERCENT: 0 % (ref 0–6)
ERYTHROCYTE [DISTWIDTH] IN BLOOD BY AUTOMATED COUNT: 12.6 % (ref 11.5–15)
FIO2: 40 %
GFR, ESTIMATED: >90 ML/MIN/1.73M2
GLUCOSE BLD-MCNC: 120 MG/DL (ref 74–99)
GLUCOSE BLD-MCNC: 144 MG/DL (ref 74–99)
GLUCOSE BLD-MCNC: 158 MG/DL (ref 74–99)
GLUCOSE BLD-MCNC: 198 MG/DL (ref 74–99)
GLUCOSE SERPL-MCNC: 106 MG/DL (ref 74–99)
HCO3: 44.5 MMOL/L (ref 22–26)
HCT VFR BLD AUTO: 28.1 % (ref 37–54)
HGB BLD-MCNC: 8.3 G/DL (ref 12.5–16.5)
HHB: 1.3 % (ref 0–5)
IMM GRANULOCYTES # BLD AUTO: 0.05 K/UL (ref 0–0.58)
IMM GRANULOCYTES NFR BLD: 1 % (ref 0–5)
LAB: ABNORMAL
LYMPHOCYTES NFR BLD: 1.31 K/UL (ref 1.5–4)
LYMPHOCYTES RELATIVE PERCENT: 21 % (ref 20–42)
Lab: 1312
MCH RBC QN AUTO: 32 PG (ref 26–35)
MCHC RBC AUTO-ENTMCNC: 29.5 G/DL (ref 32–34.5)
MCV RBC AUTO: 108.5 FL (ref 80–99.9)
METHB: 0.3 % (ref 0–1.5)
MICROORGANISM SPEC CULT: ABNORMAL
MICROORGANISM SPEC CULT: ABNORMAL
MICROORGANISM/AGENT SPEC: ABNORMAL
MODE: ABNORMAL
MONOCYTES NFR BLD: 0.46 K/UL (ref 0.1–0.95)
MONOCYTES NFR BLD: 7 % (ref 2–12)
NEUTROPHILS NFR BLD: 71 % (ref 43–80)
NEUTS SEG NFR BLD: 4.39 K/UL (ref 1.8–7.3)
O2 CONTENT: 13.1 ML/DL
O2 SATURATION: 98.7 % (ref 92–98.5)
O2HB: 98.3 % (ref 94–97)
OPERATOR ID: 3186
PATIENT TEMP: 37 C
PCO2: 86.4 MMHG (ref 35–45)
PEEP/CPAP: 6 CMH2O
PFO2: 3.37 MMHG/%
PH BLOOD GAS: 7.33 (ref 7.35–7.45)
PIP: 12 CMH2O
PLATELET # BLD AUTO: 193 K/UL (ref 130–450)
PMV BLD AUTO: 10.7 FL (ref 7–12)
PO2: 134.7 MMHG (ref 75–100)
POTASSIUM SERPL-SCNC: 4.3 MMOL/L (ref 3.5–5.1)
RBC # BLD AUTO: 2.59 M/UL (ref 3.8–5.8)
RI(T): 0.38
SERVICE CMNT-IMP: ABNORMAL
SODIUM SERPL-SCNC: 146 MMOL/L (ref 136–145)
SOURCE, BLOOD GAS: ABNORMAL
SPECIMEN DESCRIPTION: ABNORMAL
THB: 9.3 G/DL (ref 11.5–16.5)
TIME ANALYZED: 1320
WBC OTHER # BLD: 6.2 K/UL (ref 4.5–11.5)

## 2025-07-19 PROCEDURE — 82805 BLOOD GASES W/O2 SATURATION: CPT

## 2025-07-19 PROCEDURE — 82962 GLUCOSE BLOOD TEST: CPT

## 2025-07-19 PROCEDURE — 6360000002 HC RX W HCPCS

## 2025-07-19 PROCEDURE — 94640 AIRWAY INHALATION TREATMENT: CPT

## 2025-07-19 PROCEDURE — 2060000000 HC ICU INTERMEDIATE R&B

## 2025-07-19 PROCEDURE — 6370000000 HC RX 637 (ALT 250 FOR IP): Performed by: HOSPITALIST

## 2025-07-19 PROCEDURE — 6370000000 HC RX 637 (ALT 250 FOR IP)

## 2025-07-19 PROCEDURE — 6360000002 HC RX W HCPCS: Performed by: HOSPITALIST

## 2025-07-19 PROCEDURE — 2580000003 HC RX 258

## 2025-07-19 PROCEDURE — 80048 BASIC METABOLIC PNL TOTAL CA: CPT

## 2025-07-19 PROCEDURE — 94660 CPAP INITIATION&MGMT: CPT

## 2025-07-19 PROCEDURE — 85025 COMPLETE CBC W/AUTO DIFF WBC: CPT

## 2025-07-19 PROCEDURE — 99233 SBSQ HOSP IP/OBS HIGH 50: CPT | Performed by: INTERNAL MEDICINE

## 2025-07-19 PROCEDURE — 6370000000 HC RX 637 (ALT 250 FOR IP): Performed by: INTERNAL MEDICINE

## 2025-07-19 PROCEDURE — 2500000003 HC RX 250 WO HCPCS: Performed by: HOSPITALIST

## 2025-07-19 RX ADMIN — SODIUM CHLORIDE, PRESERVATIVE FREE 10 ML: 5 INJECTION INTRAVENOUS at 08:44

## 2025-07-19 RX ADMIN — ENOXAPARIN SODIUM 30 MG: 100 INJECTION SUBCUTANEOUS at 08:38

## 2025-07-19 RX ADMIN — Medication 100 MG: at 08:37

## 2025-07-19 RX ADMIN — BUDESONIDE 500 MCG: 0.5 SUSPENSION RESPIRATORY (INHALATION) at 07:50

## 2025-07-19 RX ADMIN — ARFORMOTEROL TARTRATE 15 MCG: 15 SOLUTION RESPIRATORY (INHALATION) at 20:58

## 2025-07-19 RX ADMIN — AZITHROMYCIN MONOHYDRATE 500 MG: 500 INJECTION, POWDER, LYOPHILIZED, FOR SOLUTION INTRAVENOUS at 12:38

## 2025-07-19 RX ADMIN — IPRATROPIUM BROMIDE AND ALBUTEROL SULFATE 1 DOSE: 2.5; .5 SOLUTION RESPIRATORY (INHALATION) at 20:58

## 2025-07-19 RX ADMIN — IPRATROPIUM BROMIDE AND ALBUTEROL SULFATE 1 DOSE: 2.5; .5 SOLUTION RESPIRATORY (INHALATION) at 12:54

## 2025-07-19 RX ADMIN — IPRATROPIUM BROMIDE AND ALBUTEROL SULFATE 1 DOSE: 2.5; .5 SOLUTION RESPIRATORY (INHALATION) at 07:50

## 2025-07-19 RX ADMIN — ARFORMOTEROL TARTRATE 15 MCG: 15 SOLUTION RESPIRATORY (INHALATION) at 07:50

## 2025-07-19 RX ADMIN — IPRATROPIUM BROMIDE AND ALBUTEROL SULFATE 1 DOSE: 2.5; .5 SOLUTION RESPIRATORY (INHALATION) at 16:05

## 2025-07-19 RX ADMIN — SODIUM CHLORIDE, PRESERVATIVE FREE 10 ML: 5 INJECTION INTRAVENOUS at 20:35

## 2025-07-19 RX ADMIN — BUDESONIDE 500 MCG: 0.5 SUSPENSION RESPIRATORY (INHALATION) at 20:58

## 2025-07-19 RX ADMIN — INSULIN LISPRO 1 UNITS: 100 INJECTION, SOLUTION INTRAVENOUS; SUBCUTANEOUS at 16:36

## 2025-07-19 RX ADMIN — LORAZEPAM 2 MG: 1 TABLET ORAL at 00:59

## 2025-07-19 NOTE — PLAN OF CARE
Problem: Seizure Precautions  Goal: Remains free of injury related to seizures activity  Outcome: Progressing     Problem: Safety - Adult  Goal: Free from fall injury  Outcome: Progressing     Problem: Nutrition Deficit:  Goal: Optimize nutritional status  Outcome: Progressing

## 2025-07-19 NOTE — PROGRESS NOTES
Speech Language Pathology  NAME:  Anthony Tam  :  1965  DATE: 2025  [facility]OM:  [facility]    Pt unavailable at 1400 for Clinical Swallow Evaluation Discussed with patient nurse in person and respiratory therapist     REASON:  HOLD per RN, Pt is on BiPAP and unable to come off due to his co2 level. SLP plans to evaluate at a later date.       Thank You,   Victorino Abdi MSCCC/SLP  Speech Language Pathologist  [phone]

## 2025-07-19 NOTE — PLAN OF CARE
Problem: Seizure Precautions  Goal: Remains free of injury related to seizures activity  7/19/2025 1434 by Hardik Leone RN  Outcome: Progressing  7/19/2025 0338 by Nguyen Parekh RN  Outcome: Progressing     Problem: Safety - Adult  Goal: Free from fall injury  7/19/2025 1434 by Hardik Leone RN  Outcome: Progressing  7/19/2025 0338 by Nguyen Parekh RN  Outcome: Progressing     Problem: Nutrition Deficit:  Goal: Optimize nutritional status  7/19/2025 1434 by Hardik Leone RN  Outcome: Progressing  7/19/2025 0338 by Nguyen Parekh RN  Outcome: Progressing

## 2025-07-19 NOTE — PROGRESS NOTES
mg q8hr prn.   IV abx.  Lactic Acidosis: initial lactate 4.5, repeat 3.7. RESOLVED  History of ETOH use disorder:   CIWA driven IV ativan, IV thiamine x 1 then  mg daily, MVI and folate  Tobacco Use Disorder: previously smoked over 2 ppd. Down to 2 cigarettes a day.  on cessation.   Moderate Protein Calorie Malnutrition: POA,   BMI is 14.51 with recent weight loss, loss of deltoid, temporalis and interosseous musculature on exam as well as subcutaneous fat.   Consult dietician for further nutritional assessment.   GERD: continue ppi   Hypotension versus he just runs low  Fluid Bolus  Solumedrol increase to 60 mg q 6hrs  Too late to draw ACTH stim testing  Multiple fractures right foot  Podiatry consultation for possible Lisfranc fracture      Case Discussed with:Dr Burnett      Electronically signed by Caleb Brown MD on 7/19/2025 at 1:02 PM

## 2025-07-19 NOTE — PROGRESS NOTES
Department of Podiatry  Progress Note    SUBJECTIVE:  Anthony Tam is seen at bedside for multiple right foot fractures. No acute events overnight. Patient denies any N/V/D/F/C/SOB/CP. No other pedal complaints at this time. Patient does not have any pain today and asked if he would be able to start walking later today on his foot. He was told he could start walking as long as wears the boot on his right foot while walking.     OBJECTIVE:    Scheduled Meds:   nicotine  1 patch TransDERmal Daily    predniSONE  40 mg Oral Daily    Followed by    [START ON 7/22/2025] predniSONE  30 mg Oral Daily    Followed by    [START ON 7/25/2025] predniSONE  20 mg Oral Daily    Followed by    [START ON 7/28/2025] predniSONE  10 mg Oral Daily    insulin lispro  0-4 Units SubCUTAneous 4x Daily AC & HS    enoxaparin  30 mg SubCUTAneous Daily    metoprolol succinate  25 mg Oral Daily    sodium chloride flush  5-40 mL IntraVENous 2 times per day    multivitamin  1 tablet Oral Daily    folic acid  1 mg Oral Daily    thiamine  100 mg Oral Daily    azithromycin  500 mg IntraVENous Q24H    ipratropium 0.5 mg-albuterol 2.5 mg  1 Dose Inhalation Q4H WA RT    arformoterol tartrate  15 mcg Nebulization BID RT    budesonide  0.5 mg Nebulization BID RT     Continuous Infusions:   dextrose      sodium chloride       PRN Meds:.glucagon (rDNA), glucose, dextrose bolus **OR** dextrose bolus, dextrose, ondansetron **OR** ondansetron, polyethylene glycol, acetaminophen **OR** acetaminophen, benzonatate, sodium chloride flush, sodium chloride, LORazepam **OR** [DISCONTINUED] LORazepam **OR** LORazepam **OR** [DISCONTINUED] LORazepam **OR** LORazepam **OR** [DISCONTINUED] LORazepam **OR** LORazepam **OR** [DISCONTINUED] LORazepam, albuterol, melatonin    No Known Allergies    BP (!) 117/40   Pulse (!) 110   Temp 97.9 °F (36.6 °C) (Axillary)   Resp 24   Ht 1.829 m (6')   Wt 56.4 kg (124 lb 5.4 oz)   SpO2 100%   BMI 16.86 kg/m²

## 2025-07-19 NOTE — PROGRESS NOTES
Neptali Archer M.D.  Guhlam Retana D.O.  Muriel Angulo M.D.  Kristen Hess M.D.   Jeo Burnett D.O.  Yonis Marques M.D.           Daily Pulmonary Progress Note    Patient:  Anthony Tam 60 y.o. male MRN: [id number]     Date of Service: 7/19/2025        Subjective      Patient was seen and examined.    More confused today.  Sitter at bedside.    Objective   Vitals: BP (!) 129/42   Pulse (!) 109   Temp 98.1 °F (36.7 °C) (Oral)   Resp 30   Ht 1.829 m (6')   Wt 56.4 kg (124 lb 5.4 oz)   SpO2 100%   BMI 16.86 kg/m²     I/O:    Intake/Output Summary (Last 24 hours) at 7/19/2025 1548  Last data filed at 7/19/2025 1352  Gross per 24 hour   Intake 10 ml   Output 1300 ml   Net -1290 ml       CURRENT MEDS :  Scheduled Meds:   nicotine  1 patch TransDERmal Daily    predniSONE  40 mg Oral Daily    Followed by    [START ON 7/22/2025] predniSONE  30 mg Oral Daily    Followed by    [START ON 7/25/2025] predniSONE  20 mg Oral Daily    Followed by    [START ON 7/28/2025] predniSONE  10 mg Oral Daily    insulin lispro  0-4 Units SubCUTAneous 4x Daily AC & HS    enoxaparin  30 mg SubCUTAneous Daily    metoprolol succinate  25 mg Oral Daily    sodium chloride flush  5-40 mL IntraVENous 2 times per day    multivitamin  1 tablet Oral Daily    folic acid  1 mg Oral Daily    thiamine  100 mg Oral Daily    azithromycin  500 mg IntraVENous Q24H    ipratropium 0.5 mg-albuterol 2.5 mg  1 Dose Inhalation Q4H WA RT    arformoterol tartrate  15 mcg Nebulization BID RT    budesonide  0.5 mg Nebulization BID RT       Continuous Infusions:   dextrose      sodium chloride         PRN Meds:  glucagon (rDNA), glucose, dextrose bolus **OR** dextrose bolus, dextrose, ondansetron **OR** ondansetron, polyethylene glycol, acetaminophen **OR** acetaminophen, benzonatate, sodium chloride flush, sodium chloride, LORazepam **OR** [DISCONTINUED] LORazepam **OR** LORazepam **OR** [DISCONTINUED] LORazepam **OR** LORazepam **OR**

## 2025-07-19 NOTE — PROGRESS NOTES
Perfect serve sent via secure message to Janusz Yarbrough regarding increased confusion, impulsiveness, and agitation. Patient was medicated with ativan 2mg for a CIWA score of 12 at 2001. PCA at bedside due to multiple attempts to climb out of the bed. Patient keeps stating that he is going out to smoke. Awaiting response.

## 2025-07-19 NOTE — PROGRESS NOTES
Perfect serve sent to pulmo regarding co2 level of 42, and perfect serve sent to Dr. Lawrence regarding sodium of 146.

## 2025-07-20 LAB
ANION GAP SERPL CALCULATED.3IONS-SCNC: 5 MMOL/L (ref 7–16)
B.E.: 18.4 MMOL/L (ref -3–3)
B.E.: 22.1 MMOL/L (ref -3–3)
B.E.: 23.7 MMOL/L (ref -3–3)
BASOPHILS # BLD: 0.02 K/UL (ref 0–0.2)
BASOPHILS NFR BLD: 1 % (ref 0–2)
BUN SERPL-MCNC: 16 MG/DL (ref 8–23)
CALCIUM SERPL-MCNC: 8.7 MG/DL (ref 8.8–10.2)
CHLORIDE SERPL-SCNC: 96 MMOL/L (ref 98–107)
CO2 SERPL-SCNC: 43 MMOL/L (ref 22–29)
COHB: 0 % (ref 0–1.5)
COHB: 0.1 % (ref 0–1.5)
COHB: 0.2 % (ref 0–1.5)
COMMENT: ABNORMAL
CREAT SERPL-MCNC: 0.5 MG/DL (ref 0.7–1.2)
CRITICAL: ABNORMAL
DATE ANALYZED: ABNORMAL
DATE OF COLLECTION: ABNORMAL
EOSINOPHIL # BLD: 0.02 K/UL (ref 0.05–0.5)
EOSINOPHILS RELATIVE PERCENT: 1 % (ref 0–6)
ERYTHROCYTE [DISTWIDTH] IN BLOOD BY AUTOMATED COUNT: 12.5 % (ref 11.5–15)
FIO2: 40 %
GFR, ESTIMATED: >90 ML/MIN/1.73M2
GLUCOSE BLD-MCNC: 128 MG/DL (ref 74–99)
GLUCOSE BLD-MCNC: 150 MG/DL (ref 74–99)
GLUCOSE BLD-MCNC: 164 MG/DL (ref 74–99)
GLUCOSE BLD-MCNC: 194 MG/DL (ref 74–99)
GLUCOSE SERPL-MCNC: 122 MG/DL (ref 74–99)
HCO3: 47.8 MMOL/L (ref 22–26)
HCO3: 51.6 MMOL/L (ref 22–26)
HCO3: 51.9 MMOL/L (ref 22–26)
HCT VFR BLD AUTO: 25.3 % (ref 37–54)
HGB BLD-MCNC: 7.5 G/DL (ref 12.5–16.5)
HHB: 18.8 % (ref 0–5)
HHB: 2.1 % (ref 0–5)
HHB: 4.9 % (ref 0–5)
IMM GRANULOCYTES # BLD AUTO: 0.03 K/UL (ref 0–0.58)
IMM GRANULOCYTES NFR BLD: 1 % (ref 0–5)
LAB: ABNORMAL
LYMPHOCYTES NFR BLD: 1.26 K/UL (ref 1.5–4)
LYMPHOCYTES RELATIVE PERCENT: 30 % (ref 20–42)
Lab: 1104
Lab: 625
Lab: 655
MCH RBC QN AUTO: 32.6 PG (ref 26–35)
MCHC RBC AUTO-ENTMCNC: 29.6 G/DL (ref 32–34.5)
MCV RBC AUTO: 110 FL (ref 80–99.9)
METHB: 0.3 % (ref 0–1.5)
MODE: ABNORMAL
MONOCYTES NFR BLD: 0.39 K/UL (ref 0.1–0.95)
MONOCYTES NFR BLD: 9 % (ref 2–12)
NEUTROPHILS NFR BLD: 59 % (ref 43–80)
NEUTS SEG NFR BLD: 2.52 K/UL (ref 1.8–7.3)
O2 CONTENT: 10.6 ML/DL
O2 CONTENT: 11.8 ML/DL
O2 CONTENT: 12.7 ML/DL
O2 SATURATION: 81.1 % (ref 92–98.5)
O2 SATURATION: 95.1 % (ref 92–98.5)
O2 SATURATION: 97.9 % (ref 92–98.5)
O2HB: 80.7 % (ref 94–97)
O2HB: 94.7 % (ref 94–97)
O2HB: 97.6 % (ref 94–97)
OPERATOR ID: 1023
OPERATOR ID: 3186
OPERATOR ID: 3342
PATIENT TEMP: 37 C
PCO2: 100.6 MMHG (ref 35–45)
PCO2: 85.9 MMHG (ref 35–45)
PCO2: 97.2 MMHG (ref 35–45)
PEEP/CPAP: 6 CMH2O
PFO2: 1.17 MMHG/%
PFO2: 1.79 MMHG/%
PFO2: 2.65 MMHG/%
PH BLOOD GAS: 7.31 (ref 7.35–7.45)
PH BLOOD GAS: 7.33 (ref 7.35–7.45)
PH BLOOD GAS: 7.4 (ref 7.35–7.45)
PLATELET # BLD AUTO: 174 K/UL (ref 130–450)
PMV BLD AUTO: 10.9 FL (ref 7–12)
PO2: 106 MMHG (ref 75–100)
PO2: 46.8 MMHG (ref 75–100)
PO2: 71.6 MMHG (ref 75–100)
POTASSIUM SERPL-SCNC: 4.2 MMOL/L (ref 3.5–5.1)
RBC # BLD AUTO: 2.3 M/UL (ref 3.8–5.8)
RI(T): 0.6
RI(T): 1.6
RI(T): 2.71
RR MECHANICAL: 18 B/MIN
SODIUM SERPL-SCNC: 144 MMOL/L (ref 136–145)
SOURCE, BLOOD GAS: ABNORMAL
THB: 8.8 G/DL (ref 11.5–16.5)
THB: 9.1 G/DL (ref 11.5–16.5)
THB: 9.3 G/DL (ref 11.5–16.5)
TIME ANALYZED: 1108
TIME ANALYZED: 636
TIME ANALYZED: 701
VT MECHANICAL: 450 ML
VT MECHANICAL: 450 ML
VT MECHANICAL: 550 ML
WBC OTHER # BLD: 4.2 K/UL (ref 4.5–11.5)

## 2025-07-20 PROCEDURE — 99233 SBSQ HOSP IP/OBS HIGH 50: CPT | Performed by: INTERNAL MEDICINE

## 2025-07-20 PROCEDURE — 80048 BASIC METABOLIC PNL TOTAL CA: CPT

## 2025-07-20 PROCEDURE — 82962 GLUCOSE BLOOD TEST: CPT

## 2025-07-20 PROCEDURE — 94660 CPAP INITIATION&MGMT: CPT

## 2025-07-20 PROCEDURE — 6360000002 HC RX W HCPCS: Performed by: HOSPITALIST

## 2025-07-20 PROCEDURE — 82805 BLOOD GASES W/O2 SATURATION: CPT

## 2025-07-20 PROCEDURE — 2500000003 HC RX 250 WO HCPCS: Performed by: HOSPITALIST

## 2025-07-20 PROCEDURE — 2700000000 HC OXYGEN THERAPY PER DAY

## 2025-07-20 PROCEDURE — 2580000003 HC RX 258

## 2025-07-20 PROCEDURE — 6360000002 HC RX W HCPCS

## 2025-07-20 PROCEDURE — 2060000000 HC ICU INTERMEDIATE R&B

## 2025-07-20 PROCEDURE — 85025 COMPLETE CBC W/AUTO DIFF WBC: CPT

## 2025-07-20 PROCEDURE — 6370000000 HC RX 637 (ALT 250 FOR IP)

## 2025-07-20 PROCEDURE — 6370000000 HC RX 637 (ALT 250 FOR IP): Performed by: INTERNAL MEDICINE

## 2025-07-20 PROCEDURE — 94640 AIRWAY INHALATION TREATMENT: CPT

## 2025-07-20 PROCEDURE — 6370000000 HC RX 637 (ALT 250 FOR IP): Performed by: HOSPITALIST

## 2025-07-20 RX ORDER — ENOXAPARIN SODIUM 100 MG/ML
40 INJECTION SUBCUTANEOUS DAILY
Status: DISCONTINUED | OUTPATIENT
Start: 2025-07-20 | End: 2025-07-24 | Stop reason: HOSPADM

## 2025-07-20 RX ADMIN — ARFORMOTEROL TARTRATE 15 MCG: 15 SOLUTION RESPIRATORY (INHALATION) at 07:55

## 2025-07-20 RX ADMIN — AZITHROMYCIN MONOHYDRATE 500 MG: 500 INJECTION, POWDER, LYOPHILIZED, FOR SOLUTION INTRAVENOUS at 13:01

## 2025-07-20 RX ADMIN — ARFORMOTEROL TARTRATE 15 MCG: 15 SOLUTION RESPIRATORY (INHALATION) at 20:53

## 2025-07-20 RX ADMIN — IPRATROPIUM BROMIDE AND ALBUTEROL SULFATE 1 DOSE: 2.5; .5 SOLUTION RESPIRATORY (INHALATION) at 16:10

## 2025-07-20 RX ADMIN — ENOXAPARIN SODIUM 40 MG: 100 INJECTION SUBCUTANEOUS at 08:35

## 2025-07-20 RX ADMIN — SODIUM CHLORIDE, PRESERVATIVE FREE 10 ML: 5 INJECTION INTRAVENOUS at 08:35

## 2025-07-20 RX ADMIN — PREDNISONE 40 MG: 20 TABLET ORAL at 08:35

## 2025-07-20 RX ADMIN — SODIUM CHLORIDE, PRESERVATIVE FREE 10 ML: 5 INJECTION INTRAVENOUS at 19:47

## 2025-07-20 RX ADMIN — MULTIVITAMIN TABLET 1 TABLET: TABLET at 08:35

## 2025-07-20 RX ADMIN — IPRATROPIUM BROMIDE AND ALBUTEROL SULFATE 1 DOSE: 2.5; .5 SOLUTION RESPIRATORY (INHALATION) at 13:19

## 2025-07-20 RX ADMIN — FOLIC ACID 1 MG: 1 TABLET ORAL at 08:35

## 2025-07-20 RX ADMIN — BUDESONIDE 500 MCG: 0.5 SUSPENSION RESPIRATORY (INHALATION) at 07:55

## 2025-07-20 RX ADMIN — ACETAMINOPHEN 650 MG: 325 TABLET ORAL at 22:47

## 2025-07-20 RX ADMIN — IPRATROPIUM BROMIDE AND ALBUTEROL SULFATE 1 DOSE: 2.5; .5 SOLUTION RESPIRATORY (INHALATION) at 07:55

## 2025-07-20 RX ADMIN — Medication 100 MG: at 08:35

## 2025-07-20 RX ADMIN — IPRATROPIUM BROMIDE AND ALBUTEROL SULFATE 1 DOSE: 2.5; .5 SOLUTION RESPIRATORY (INHALATION) at 20:53

## 2025-07-20 RX ADMIN — INSULIN LISPRO 1 UNITS: 100 INJECTION, SOLUTION INTRAVENOUS; SUBCUTANEOUS at 16:02

## 2025-07-20 RX ADMIN — BUDESONIDE 500 MCG: 0.5 SUSPENSION RESPIRATORY (INHALATION) at 20:53

## 2025-07-20 RX ADMIN — METOPROLOL SUCCINATE 25 MG: 25 TABLET, EXTENDED RELEASE ORAL at 08:35

## 2025-07-20 ASSESSMENT — PAIN DESCRIPTION - ORIENTATION: ORIENTATION: RIGHT;LEFT;MID

## 2025-07-20 ASSESSMENT — PAIN SCALES - GENERAL
PAINLEVEL_OUTOF10: 0
PAINLEVEL_OUTOF10: 9

## 2025-07-20 ASSESSMENT — PAIN DESCRIPTION - DESCRIPTORS: DESCRIPTORS: ACHING;DISCOMFORT

## 2025-07-20 ASSESSMENT — PAIN DESCRIPTION - LOCATION: LOCATION: HEAD

## 2025-07-20 ASSESSMENT — PAIN - FUNCTIONAL ASSESSMENT: PAIN_FUNCTIONAL_ASSESSMENT: ACTIVITIES ARE NOT PREVENTED

## 2025-07-20 NOTE — PROGRESS NOTES
Progress  Note  Anthony Tam  [id number]  1965  [id number]    Chief Complaint   Patient presents with    Other     Failure to thrive. Patient not eating, incontinent, and weak. Shortness of breath. 76 percent at pivot, room air     Historical Issues:    Principal Problem:    Acute on chronic respiratory failure with hypercapnia (HCC)  Active Problems:    Alcohol abuse    COPD exacerbation (HCC)    Gastroesophageal reflux disease    Heavy tobacco smoker    Immunoglobulin A deficiency (HCC)    Insomnia    Severe protein-energy malnutrition    Lactic acidosis    Acute on chronic respiratory failure (HCC)  Resolved Problems:    * No resolved hospital problems. *    Current Facility-Administered Medications   Medication Dose Route Frequency Provider Last Rate Last Admin    enoxaparin (LOVENOX) injection 40 mg  40 mg SubCUTAneous Daily Frederic Hardy DO   40 mg at 07/20/25 0835    nicotine (NICODERM CQ) 7 MG/24HR 1 patch  1 patch TransDERmal Daily Caleb Brown MD   1 patch at 07/20/25 0836    predniSONE (DELTASONE) tablet 40 mg  40 mg Oral Daily Zaira Joe APRN - CNP   40 mg at 07/20/25 0835    Followed by    [START ON 7/22/2025] predniSONE (DELTASONE) tablet 30 mg  30 mg Oral Daily Zaira Joe APRN - CNP        Followed by    [START ON 7/25/2025] predniSONE (DELTASONE) tablet 20 mg  20 mg Oral Daily Zaira Joe APRN - CNP        Followed by    [START ON 7/28/2025] predniSONE (DELTASONE) tablet 10 mg  10 mg Oral Daily Zaira Joe APRN - CNP        glucagon injection 1 mg  1 mg SubCUTAneous PRN Caleb Brown MD        glucose chewable tablet 16 g  4 tablet Oral PRN Caleb Brown MD        dextrose bolus 10% 125 mL  125 mL IntraVENous PRN Caleb Brown MD        Or    dextrose bolus 10% 250 mL  250 mL IntraVENous PRCaleb Alves MD        dextrose 10 % infusion   IntraVENous Continuous PRCaleb Alves MD        insulin lispro (HUMALOG,ADMELOG) injection vial 0-4 Units  0-4 Units  Zaira Joe APRN - CNP   1 Dose at 07/20/25 0755    arformoterol tartrate (BROVANA) nebulizer solution 15 mcg  15 mcg Nebulization BID RT Zaira Joe APRN - CNP   15 mcg at 07/20/25 0755    budesonide (PULMICORT) nebulizer suspension 500 mcg  0.5 mg Nebulization BID RT Zaira Joe APRN - CNP   500 mcg at 07/20/25 0755    albuterol (PROVENTIL) (2.5 MG/3ML) 0.083% nebulizer solution 2.5 mg  2.5 mg Nebulization Q6H PRN Zaira Joe APRN - CNP        melatonin tablet 3 mg  3 mg Oral Nightly PRN Romeo Plaza,                    Recent Complaints:  Review of Systems  Vitals:    07/20/25 0756   BP:    Pulse:    Resp: 18   Temp:    SpO2:      Physical Exam  Vitals reviewed.   Constitutional:       General: He is not in acute distress.     Appearance: He is ill-appearing.   Cardiovascular:      Rate and Rhythm: Normal rate.   Pulmonary:      Effort: Prolonged expiration present.      Breath sounds: Decreased air movement present.   Musculoskeletal:         General: Swelling and signs of injury present.      Right lower leg: Edema present.   Neurological:      Mental Status: He is alert.         Recent Labs     07/18/25  0335 07/19/25  0547 07/20/25  0230    146* 144   K 4.2 4.3 4.2   CL 97* 100 96*   CO2 38* 42* 43*   BUN 15 14 16   CREATININE 0.5* 0.5* 0.5*   GLUCOSE 91 106* 122*   CALCIUM 8.9 9.1 8.7*     Recent Labs     07/18/25  0335 07/19/25  0547 07/20/25  0230   WBC 5.3 6.2 4.2*   RBC 2.58* 2.59* 2.30*   HGB 8.3* 8.3* 7.5*   HCT 26.6* 28.1* 25.3*   .1* 108.5* 110.0*   MCH 32.2 32.0 32.6   MCHC 31.2* 29.5* 29.6*   RDW 12.4 12.6 12.5    193 174   MPV 11.1 10.7 10.9               Assessment/Plan:  Acute on Chronic Hypercapnic Hypoxemic Respiratory failure: POA,   ABG Saturday  shows continued hypercapnea  Sunday worsening. Dr Burnett making additional changes.  AVaps  COPD exacerbation: POA, 2/2 to noncompliance with medications,   scheduled duo-nebs q4hr while awake & prn.   IV

## 2025-07-20 NOTE — PROGRESS NOTES
Sent message to Marisa Bernal regarding Patient complaining of left breast pain. It is significantly more swollen and taut then her right breast.

## 2025-07-20 NOTE — PLAN OF CARE
Problem: Seizure Precautions  Goal: Remains free of injury related to seizures activity  7/19/2025 2229 by Kristine Oden RN  Outcome: Progressing  7/19/2025 1434 by Hardik Leone RN  Outcome: Progressing  Flowsheets (Taken 7/19/2025 0835)  Remains free of injury related to seizure activity: Maintain airway, patient safety  and administer oxygen as ordered     Problem: Safety - Adult  Goal: Free from fall injury  7/19/2025 2229 by Kristine Oden RN  Outcome: Progressing  7/19/2025 1434 by Hardik Leone RN  Outcome: Progressing     Problem: Nutrition Deficit:  Goal: Optimize nutritional status  7/19/2025 2229 by Kristine Oden RN  Outcome: Progressing  7/19/2025 1434 by Hardik Leone RN  Outcome: Progressing     Problem: Confusion  Goal: Confusion, delirium, dementia, or psychosis is improved or at baseline  Description: INTERVENTIONS:  1. Assess for possible contributors to thought disturbance, including medications, impaired vision or hearing, underlying metabolic abnormalities, dehydration, psychiatric diagnoses, and notify attending LIP  2. [place] high risk fall precautions, as indicated  3. Provide frequent short contacts to provide reality reorientation, refocusing and direction  4. Decrease environmental stimuli, including noise as appropriate  5. Monitor and intervene to maintain adequate nutrition, hydration, elimination, sleep and activity  6. If unable to ensure safety without constant attention obtain sitter and review sitter guidelines with assigned personnel  7. Initiate Psychosocial CNS and Spiritual Care consult, as indicated  Outcome: Progressing     Problem: Skin/Tissue Integrity  Goal: Skin integrity remains intact  Description: 1.  Monitor for areas of redness and/or skin breakdown  2.  Assess vascular access sites hourly  3.  Every 4-6 hours minimum:  Change oxygen saturation probe site  4.  Every 4-6 hours:  If on nasal continuous positive airway pressure, respiratory therapy

## 2025-07-20 NOTE — PROGRESS NOTES
Department of Podiatry  Progress Note    SUBJECTIVE:  Anthony Tam is seen at bedside for multiple right foot fractures. No acute events overnight. Patient is asleep at the time of the exam. Appropriate chest rise and fall visualized.    OBJECTIVE:    Scheduled Meds:   enoxaparin  40 mg SubCUTAneous Daily    nicotine  1 patch TransDERmal Daily    predniSONE  40 mg Oral Daily    Followed by    [START ON 7/22/2025] predniSONE  30 mg Oral Daily    Followed by    [START ON 7/25/2025] predniSONE  20 mg Oral Daily    Followed by    [START ON 7/28/2025] predniSONE  10 mg Oral Daily    insulin lispro  0-4 Units SubCUTAneous 4x Daily AC & HS    metoprolol succinate  25 mg Oral Daily    sodium chloride flush  5-40 mL IntraVENous 2 times per day    multivitamin  1 tablet Oral Daily    folic acid  1 mg Oral Daily    thiamine  100 mg Oral Daily    azithromycin  500 mg IntraVENous Q24H    ipratropium 0.5 mg-albuterol 2.5 mg  1 Dose Inhalation Q4H WA RT    arformoterol tartrate  15 mcg Nebulization BID RT    budesonide  0.5 mg Nebulization BID RT     Continuous Infusions:   dextrose      sodium chloride       PRN Meds:.glucagon (rDNA), glucose, dextrose bolus **OR** dextrose bolus, dextrose, ondansetron **OR** ondansetron, polyethylene glycol, acetaminophen **OR** acetaminophen, benzonatate, sodium chloride flush, sodium chloride, LORazepam **OR** [DISCONTINUED] LORazepam **OR** LORazepam **OR** [DISCONTINUED] LORazepam **OR** LORazepam **OR** [DISCONTINUED] LORazepam **OR** LORazepam **OR** [DISCONTINUED] LORazepam, albuterol, melatonin    No Known Allergies    /70   Pulse (!) 104   Temp 98 °F (36.7 °C) (Axillary)   Resp 20   Ht 1.829 m (6')   Wt 56.4 kg (124 lb 5.4 oz)   SpO2 99%   BMI 16.86 kg/m²       EXAM:    VASCULAR:  DP and PT pulses are palpable. CFT < 5 seconds B/L.  Warm to warm from the tibial tuberosity to the distal aspect of the digits dorsally. Hair growth not noted to the distal aspects dorsally.      NEUROLOGIC:  Light touch is impaired     MUSCULOSKELETAL: Deformities are not noted in Bilateral lower extremities.  5/5 Gross Muscle strength in all 4 quadrants.     DERM:  Skin is clean, dry, and intact to the bilateral lower extremities.  Tenderness upon palpation of the right midfoot. Edema is mild to the right midfoot. Erythema is absent. Wounds are Absent on the Bilateral lower extremities.           Scheduled Meds:   enoxaparin  40 mg SubCUTAneous Daily    nicotine  1 patch TransDERmal Daily    predniSONE  40 mg Oral Daily    Followed by    [START ON 7/22/2025] predniSONE  30 mg Oral Daily    Followed by    [START ON 7/25/2025] predniSONE  20 mg Oral Daily    Followed by    [START ON 7/28/2025] predniSONE  10 mg Oral Daily    insulin lispro  0-4 Units SubCUTAneous 4x Daily AC & HS    metoprolol succinate  25 mg Oral Daily    sodium chloride flush  5-40 mL IntraVENous 2 times per day    multivitamin  1 tablet Oral Daily    folic acid  1 mg Oral Daily    thiamine  100 mg Oral Daily    azithromycin  500 mg IntraVENous Q24H    ipratropium 0.5 mg-albuterol 2.5 mg  1 Dose Inhalation Q4H WA RT    arformoterol tartrate  15 mcg Nebulization BID RT    budesonide  0.5 mg Nebulization BID RT     Continuous Infusions:   dextrose      sodium chloride       PRN Meds:.glucagon (rDNA), glucose, dextrose bolus **OR** dextrose bolus, dextrose, ondansetron **OR** ondansetron, polyethylene glycol, acetaminophen **OR** acetaminophen, benzonatate, sodium chloride flush, sodium chloride, LORazepam **OR** [DISCONTINUED] LORazepam **OR** LORazepam **OR** [DISCONTINUED] LORazepam **OR** LORazepam **OR** [DISCONTINUED] LORazepam **OR** LORazepam **OR** [DISCONTINUED] LORazepam, albuterol, melatonin    RADIOLOGY:  CT Head W/O Contrast   Final Result   1. No acute intracranial abnormality.   2. No acute cervical spine fracture.         CT CSpine W/O Contrast   Final Result   1. No acute intracranial abnormality.   2. No acute cervical

## 2025-07-20 NOTE — PROGRESS NOTES
Neptali Archer M.D.  Ghulam Retana D.O.  Muriel Angulo M.D.  Kristen Hess M.D.   Joe Burnett D.O.  Yonis Marques M.D.           Daily Pulmonary Progress Note    Patient:  Anthony Tam 60 y.o. male MRN: [id number]     Date of Service: 7/20/2025        Subjective      Patient was seen and examined.    Hypercapnic this morning.  NIV adjusted and repeat ABG shows improvement in CO2.    Objective   Vitals: BP (!) 121/56   Pulse (!) 110   Temp 98.1 °F (36.7 °C) (Axillary)   Resp 24   Ht 1.829 m (6')   Wt 56.4 kg (124 lb 5.4 oz)   SpO2 99%   BMI 16.86 kg/m²     I/O:    Intake/Output Summary (Last 24 hours) at 7/20/2025 1707  Last data filed at 7/20/2025 1556  Gross per 24 hour   Intake --   Output 1350 ml   Net -1350 ml       CURRENT MEDS :  Scheduled Meds:   enoxaparin  40 mg SubCUTAneous Daily    nicotine  1 patch TransDERmal Daily    predniSONE  40 mg Oral Daily    Followed by    [START ON 7/22/2025] predniSONE  30 mg Oral Daily    Followed by    [START ON 7/25/2025] predniSONE  20 mg Oral Daily    Followed by    [START ON 7/28/2025] predniSONE  10 mg Oral Daily    insulin lispro  0-4 Units SubCUTAneous 4x Daily AC & HS    metoprolol succinate  25 mg Oral Daily    sodium chloride flush  5-40 mL IntraVENous 2 times per day    multivitamin  1 tablet Oral Daily    folic acid  1 mg Oral Daily    thiamine  100 mg Oral Daily    ipratropium 0.5 mg-albuterol 2.5 mg  1 Dose Inhalation Q4H WA RT    arformoterol tartrate  15 mcg Nebulization BID RT    budesonide  0.5 mg Nebulization BID RT       Continuous Infusions:   dextrose      sodium chloride         PRN Meds:  glucagon (rDNA), glucose, dextrose bolus **OR** dextrose bolus, dextrose, ondansetron **OR** ondansetron, polyethylene glycol, acetaminophen **OR** acetaminophen, benzonatate, sodium chloride flush, sodium chloride, LORazepam **OR** [DISCONTINUED] LORazepam **OR** LORazepam **OR** [DISCONTINUED] LORazepam **OR** LORazepam **OR** [DISCONTINUED]

## 2025-07-20 NOTE — PLAN OF CARE
Problem: Seizure Precautions  Goal: Remains free of injury related to seizures activity  7/20/2025 1023 by Yvette Wolff RN  Outcome: Progressing  7/19/2025 2229 by Kristine Oden RN  Outcome: Progressing     Problem: Safety - Adult  Goal: Free from fall injury  7/20/2025 1023 by Yvette Wolff RN  Outcome: Progressing  7/19/2025 2229 by Kristine Oden RN  Outcome: Progressing     Problem: Nutrition Deficit:  Goal: Optimize nutritional status  7/20/2025 1023 by Yvette Wolff RN  Outcome: Progressing  7/19/2025 2229 by Kristine Oden RN  Outcome: Progressing     Problem: Confusion  Goal: Confusion, delirium, dementia, or psychosis is improved or at baseline  Description: INTERVENTIONS:  1. Assess for possible contributors to thought disturbance, including medications, impaired vision or hearing, underlying metabolic abnormalities, dehydration, psychiatric diagnoses, and notify attending LIP  2. [place] high risk fall precautions, as indicated  3. Provide frequent short contacts to provide reality reorientation, refocusing and direction  4. Decrease environmental stimuli, including noise as appropriate  5. Monitor and intervene to maintain adequate nutrition, hydration, elimination, sleep and activity  6. If unable to ensure safety without constant attention obtain sitter and review sitter guidelines with assigned personnel  7. Initiate Psychosocial CNS and Spiritual Care consult, as indicated  7/20/2025 1023 by Yvette Wolff RN  Outcome: Progressing  7/19/2025 2229 by Kristine Oden RN  Outcome: Progressing     Problem: Skin/Tissue Integrity  Goal: Skin integrity remains intact  Description: 1.  Monitor for areas of redness and/or skin breakdown  2.  Assess vascular access sites hourly  3.  Every 4-6 hours minimum:  Change oxygen saturation probe site  4.  Every 4-6 hours:  If on nasal continuous positive airway pressure, respiratory therapy assess nares and determine need for appliance change

## 2025-07-20 NOTE — PROGRESS NOTES
This patient is on medication that requires renal, weight, and/or indication dose adjustment.      Date Body Weight IBW  Adjusted BW SCr  CrCl Dialysis status   7/20/2025 56.4 kg (124 lb 5.4 oz) Ideal body weight: 77.6 kg (171 lb 1.2 oz) Serum creatinine: 0.5 mg/dL (L) 07/20/25 0230  Estimated creatinine clearance: 125 mL/min (A) N/a       Pharmacy has dose-adjusted the following medication(s):    Date Previous Order Adjusted Order   7/20/2025 Lovenox 30 mg daily Lovenox 40 mg daily       These changes were made per protocol according to the [facility]   Automatic Renal Dose Adjustment Policy.     *Please note this dose may need readjusted if patient's condition changes.    Please contact pharmacy with any questions regarding these changes.    Betty Dawkins RPH  7/20/2025  6:31 AM     ----- Message from Salina Medrano sent at 1/25/2024  8:01 AM CST -----  Type:  Same Day Appointment Request    Caller is requesting a same day appointment.  Caller declined first available appointment listed below.    Name of Caller:patient  When is the first available appointment?   Options offered (Virtual Visit, Urgent Care): yes  Symptoms:sinus pressure  Best Call Back Number:[phone]  Additional Information: patient is requesting same day appointment

## 2025-07-20 NOTE — PROGRESS NOTES
Date: 7/19/2025    Time: 11:19 PM    Patient Placed On BIPAP/CPAP/ Non-Invasive Ventilation?  Yes    If no must comment.  Facial area red/color change? No           If YES are Blister/Lesion present?No   If yes must notify nursing staff  BIPAP/CPAP skin barrier?  Yes    Skin barrier type:mepilex       Comments: red outlet, red cord        Erin Morrispapito, [facility]    07/19/25 1334   NIV Type   NIV Started/Stopped On   Equipment Type V60   Mode AVAPS   Mask Type Full face mask   Mask Size Medium   Settings/Measurements   PIP Observed 22 cm H20   CPAP/EPAP 6 cmH2O   IPAP Min 22 cmH2O   IPAP Max 28 cmH2O   Vt (Set, mL) 450 mL   Vt (Measured) 462 mL   Rate Ordered 14   Insp Rise Time (%) 3 %   FiO2  40 %   I Time/ I Time % 0.9 s   Minute Volume (L/min) 14.2 Liters   Mask Leak (lpm) 45 lpm   Patient's Home Machine No   Alarm Settings   Alarms On Y

## 2025-07-21 LAB
ANION GAP SERPL CALCULATED.3IONS-SCNC: 9 MMOL/L (ref 7–16)
BASOPHILS # BLD: 0.02 K/UL (ref 0–0.2)
BASOPHILS NFR BLD: 0 % (ref 0–2)
BUN SERPL-MCNC: 14 MG/DL (ref 8–23)
CALCIUM SERPL-MCNC: 8.9 MG/DL (ref 8.8–10.2)
CHLORIDE SERPL-SCNC: 94 MMOL/L (ref 98–107)
CO2 SERPL-SCNC: 41 MMOL/L (ref 22–29)
CREAT SERPL-MCNC: 0.5 MG/DL (ref 0.7–1.2)
EOSINOPHIL # BLD: 0.04 K/UL (ref 0.05–0.5)
EOSINOPHILS RELATIVE PERCENT: 1 % (ref 0–6)
ERYTHROCYTE [DISTWIDTH] IN BLOOD BY AUTOMATED COUNT: 12.9 % (ref 11.5–15)
GFR, ESTIMATED: >90 ML/MIN/1.73M2
GLUCOSE BLD-MCNC: 143 MG/DL (ref 74–99)
GLUCOSE BLD-MCNC: 164 MG/DL (ref 74–99)
GLUCOSE BLD-MCNC: 184 MG/DL (ref 74–99)
GLUCOSE BLD-MCNC: 95 MG/DL (ref 74–99)
GLUCOSE SERPL-MCNC: 89 MG/DL (ref 74–99)
HCT VFR BLD AUTO: 25.6 % (ref 37–54)
HGB BLD-MCNC: 7.9 G/DL (ref 12.5–16.5)
IMM GRANULOCYTES # BLD AUTO: <0.03 K/UL (ref 0–0.58)
IMM GRANULOCYTES NFR BLD: 0 % (ref 0–5)
LYMPHOCYTES NFR BLD: 1.8 K/UL (ref 1.5–4)
LYMPHOCYTES RELATIVE PERCENT: 35 % (ref 20–42)
MCH RBC QN AUTO: 33.2 PG (ref 26–35)
MCHC RBC AUTO-ENTMCNC: 30.9 G/DL (ref 32–34.5)
MCV RBC AUTO: 107.6 FL (ref 80–99.9)
MONOCYTES NFR BLD: 0.45 K/UL (ref 0.1–0.95)
MONOCYTES NFR BLD: 9 % (ref 2–12)
NEUTROPHILS NFR BLD: 54 % (ref 43–80)
NEUTS SEG NFR BLD: 2.76 K/UL (ref 1.8–7.3)
PLATELET # BLD AUTO: 204 K/UL (ref 130–450)
PMV BLD AUTO: 11.1 FL (ref 7–12)
POTASSIUM SERPL-SCNC: 4.4 MMOL/L (ref 3.5–5.1)
RBC # BLD AUTO: 2.38 M/UL (ref 3.8–5.8)
SODIUM SERPL-SCNC: 143 MMOL/L (ref 136–145)
WBC OTHER # BLD: 5.1 K/UL (ref 4.5–11.5)

## 2025-07-21 PROCEDURE — 82962 GLUCOSE BLOOD TEST: CPT

## 2025-07-21 PROCEDURE — 36415 COLL VENOUS BLD VENIPUNCTURE: CPT

## 2025-07-21 PROCEDURE — 6370000000 HC RX 637 (ALT 250 FOR IP)

## 2025-07-21 PROCEDURE — 94640 AIRWAY INHALATION TREATMENT: CPT

## 2025-07-21 PROCEDURE — 6370000000 HC RX 637 (ALT 250 FOR IP): Performed by: INTERNAL MEDICINE

## 2025-07-21 PROCEDURE — 6370000000 HC RX 637 (ALT 250 FOR IP): Performed by: HOSPITALIST

## 2025-07-21 PROCEDURE — 85025 COMPLETE CBC W/AUTO DIFF WBC: CPT

## 2025-07-21 PROCEDURE — 99232 SBSQ HOSP IP/OBS MODERATE 35: CPT | Performed by: STUDENT IN AN ORGANIZED HEALTH CARE EDUCATION/TRAINING PROGRAM

## 2025-07-21 PROCEDURE — 2500000003 HC RX 250 WO HCPCS: Performed by: HOSPITALIST

## 2025-07-21 PROCEDURE — 80048 BASIC METABOLIC PNL TOTAL CA: CPT

## 2025-07-21 PROCEDURE — 2700000000 HC OXYGEN THERAPY PER DAY

## 2025-07-21 PROCEDURE — 6360000002 HC RX W HCPCS

## 2025-07-21 PROCEDURE — 2060000000 HC ICU INTERMEDIATE R&B

## 2025-07-21 PROCEDURE — 94660 CPAP INITIATION&MGMT: CPT

## 2025-07-21 PROCEDURE — 6360000002 HC RX W HCPCS: Performed by: HOSPITALIST

## 2025-07-21 PROCEDURE — 97165 OT EVAL LOW COMPLEX 30 MIN: CPT

## 2025-07-21 PROCEDURE — 92610 EVALUATE SWALLOWING FUNCTION: CPT

## 2025-07-21 PROCEDURE — 97161 PT EVAL LOW COMPLEX 20 MIN: CPT

## 2025-07-21 RX ADMIN — METOPROLOL SUCCINATE 25 MG: 25 TABLET, EXTENDED RELEASE ORAL at 09:00

## 2025-07-21 RX ADMIN — PREDNISONE 40 MG: 20 TABLET ORAL at 08:58

## 2025-07-21 RX ADMIN — Medication 100 MG: at 08:59

## 2025-07-21 RX ADMIN — ARFORMOTEROL TARTRATE 15 MCG: 15 SOLUTION RESPIRATORY (INHALATION) at 09:20

## 2025-07-21 RX ADMIN — IPRATROPIUM BROMIDE AND ALBUTEROL SULFATE 1 DOSE: 2.5; .5 SOLUTION RESPIRATORY (INHALATION) at 09:20

## 2025-07-21 RX ADMIN — BUDESONIDE 500 MCG: 0.5 SUSPENSION RESPIRATORY (INHALATION) at 19:37

## 2025-07-21 RX ADMIN — IPRATROPIUM BROMIDE AND ALBUTEROL SULFATE 1 DOSE: 2.5; .5 SOLUTION RESPIRATORY (INHALATION) at 13:36

## 2025-07-21 RX ADMIN — SODIUM CHLORIDE, PRESERVATIVE FREE 10 ML: 5 INJECTION INTRAVENOUS at 09:00

## 2025-07-21 RX ADMIN — MULTIVITAMIN TABLET 1 TABLET: TABLET at 08:58

## 2025-07-21 RX ADMIN — IPRATROPIUM BROMIDE AND ALBUTEROL SULFATE 1 DOSE: 2.5; .5 SOLUTION RESPIRATORY (INHALATION) at 19:37

## 2025-07-21 RX ADMIN — BUDESONIDE 500 MCG: 0.5 SUSPENSION RESPIRATORY (INHALATION) at 09:20

## 2025-07-21 RX ADMIN — SODIUM CHLORIDE, PRESERVATIVE FREE 10 ML: 5 INJECTION INTRAVENOUS at 21:09

## 2025-07-21 RX ADMIN — BENZONATATE 100 MG: 100 CAPSULE ORAL at 08:58

## 2025-07-21 RX ADMIN — ENOXAPARIN SODIUM 40 MG: 100 INJECTION SUBCUTANEOUS at 08:59

## 2025-07-21 RX ADMIN — INSULIN LISPRO 1 UNITS: 100 INJECTION, SOLUTION INTRAVENOUS; SUBCUTANEOUS at 11:20

## 2025-07-21 RX ADMIN — IPRATROPIUM BROMIDE AND ALBUTEROL SULFATE 1 DOSE: 2.5; .5 SOLUTION RESPIRATORY (INHALATION) at 16:06

## 2025-07-21 RX ADMIN — ARFORMOTEROL TARTRATE 15 MCG: 15 SOLUTION RESPIRATORY (INHALATION) at 19:37

## 2025-07-21 ASSESSMENT — PAIN SCALES - GENERAL
PAINLEVEL_OUTOF10: 0

## 2025-07-21 NOTE — PROGRESS NOTES
[facility] Hospitalist Progress Note    Admitting Date and Time: 7/15/2025 10:47 PM  Admit Dx: Noncompliance with treatment [Z91.199]  COPD exacerbation (HCC) [J44.1]  Failure to thrive in adult [R62.7]  Multiple falls [R29.6]  Acute on chronic respiratory failure (HCC) [J96.20]  Acute on chronic respiratory failure with hypercapnia (HCC) [J96.22]  Multiple closed fractures of metatarsal bone of right foot, initial encounter [S92.301A]    Subjective:  Patient is being followed for Noncompliance with treatment [Z91.199]  COPD exacerbation (HCC) [J44.1]  Failure to thrive in adult [R62.7]  Multiple falls [R29.6]  Acute on chronic respiratory failure (HCC) [J96.20]  Acute on chronic respiratory failure with hypercapnia (HCC) [J96.22]  Multiple closed fractures of metatarsal bone of right foot, initial encounter [S92.301A]     Patient sitting up in bed on BiPAP. He feels slightly better, continues to be very short of breath. No other concerns or complaints at this time.     ROS: Pertinent findings stated above. Denies dizziness, diaphoresis, fever, chills, chest pain, palpitations, cough, abdominal pain, nausea, vomiting, dysuria, hematuria, melena, hematochezia, diarrhea, or constipation.     enoxaparin  40 mg SubCUTAneous Daily    nicotine  1 patch TransDERmal Daily    predniSONE  40 mg Oral Daily    Followed by    [START ON 7/22/2025] predniSONE  30 mg Oral Daily    Followed by    [START ON 7/25/2025] predniSONE  20 mg Oral Daily    Followed by    [START ON 7/28/2025] predniSONE  10 mg Oral Daily    insulin lispro  0-4 Units SubCUTAneous 4x Daily AC & HS    metoprolol succinate  25 mg Oral Daily    sodium chloride flush  5-40 mL IntraVENous 2 times per day    multivitamin  1 tablet Oral Daily    folic acid  1 mg Oral Daily    thiamine  100 mg Oral Daily    ipratropium 0.5 mg-albuterol 2.5 mg  1 Dose Inhalation Q4H WA RT    arformoterol tartrate  15 mcg Nebulization BID RT    budesonide  0.5 mg Nebulization        Assessment:    Principal Problem:    Acute on chronic respiratory failure with hypercapnia (HCC)  Active Problems:    Alcohol abuse    COPD exacerbation (HCC)    Gastroesophageal reflux disease    Heavy tobacco smoker    Immunoglobulin A deficiency (HCC)    Insomnia    Severe protein-energy malnutrition    Lactic acidosis    Acute on chronic respiratory failure (HCC)  Resolved Problems:    * No resolved hospital problems. *      Plan:  - Severe COPD  - Acute on Chronic Hypoxic and Hypercapnic Respiratory Failure   Continue BiPAP, NIV- AVAPS  ABGs per pulmonology  Continue Brovana, pulmicort, dunebs, prednisone, incentive spirometry  Patient will require NIV device at home    - Multiple Right Foot Fractures  Podiatry consulted   No surgical intervention     - Hx of Alcohol Abuse  [facility] protocol  Thiamine and folic acid      - Lactic Acidosis, resolved    - Nicotine Dependence  Down to 2 cigarettes from 2 ppd. Educated on cessation     - Moderate Protein Calorie Malnutrition   Dietary consulted     - GERD  Continue protonix     - Hypotension   BP now stable     -DVT prophylaxis with lovenox  -All labs and imaging reviewed, appropriate morning labs ordered     NOTE: This report was transcribed using voice recognition software. Every effort was made to ensure accuracy; however, inadvertent computerized transcription errors may be present.  Electronically signed by Clarisa Tobias MD on 7/21/2025 at 3:42 PM

## 2025-07-21 NOTE — PROGRESS NOTES
Department of Podiatry  Progress Note    SUBJECTIVE:  Anthony Tam is seen at bedside for multiple right foot fractures. No acute events overnight. Patient is asleep at the time of the exam. Appropriate chest rise and fall visualized. CPAP machine on and running appropriately.    OBJECTIVE:    Scheduled Meds:   enoxaparin  40 mg SubCUTAneous Daily    nicotine  1 patch TransDERmal Daily    predniSONE  40 mg Oral Daily    Followed by    [START ON 7/22/2025] predniSONE  30 mg Oral Daily    Followed by    [START ON 7/25/2025] predniSONE  20 mg Oral Daily    Followed by    [START ON 7/28/2025] predniSONE  10 mg Oral Daily    insulin lispro  0-4 Units SubCUTAneous 4x Daily AC & HS    metoprolol succinate  25 mg Oral Daily    sodium chloride flush  5-40 mL IntraVENous 2 times per day    multivitamin  1 tablet Oral Daily    folic acid  1 mg Oral Daily    thiamine  100 mg Oral Daily    ipratropium 0.5 mg-albuterol 2.5 mg  1 Dose Inhalation Q4H WA RT    arformoterol tartrate  15 mcg Nebulization BID RT    budesonide  0.5 mg Nebulization BID RT     Continuous Infusions:   dextrose      sodium chloride       PRN Meds:.glucagon (rDNA), glucose, dextrose bolus **OR** dextrose bolus, dextrose, ondansetron **OR** ondansetron, polyethylene glycol, acetaminophen **OR** acetaminophen, benzonatate, sodium chloride flush, sodium chloride, LORazepam **OR** [DISCONTINUED] LORazepam **OR** LORazepam **OR** [DISCONTINUED] LORazepam **OR** LORazepam **OR** [DISCONTINUED] LORazepam **OR** LORazepam **OR** [DISCONTINUED] LORazepam, albuterol, melatonin    No Known Allergies    /64   Pulse 99   Temp 98.2 °F (36.8 °C) (Oral)   Resp 20   Ht 1.829 m (6')   Wt 56.4 kg (124 lb 5.4 oz)   SpO2 99%   BMI 16.86 kg/m²       EXAM:    VASCULAR:  DP and PT pulses are palpable. CFT < 5 seconds B/L.  Warm to warm from the tibial tuberosity to the distal aspect of the digits dorsally. Hair growth not noted to the distal aspects dorsally.

## 2025-07-21 NOTE — PLAN OF CARE
Problem: Seizure Precautions  Goal: Remains free of injury related to seizures activity  7/20/2025 2119 by Kristine Oden RN  Outcome: Progressing  7/20/2025 1023 by Yvette Wolff RN  Outcome: Progressing     Problem: Safety - Adult  Goal: Free from fall injury  7/20/2025 2119 by Kristine Oden RN  Outcome: Progressing  7/20/2025 1023 by Yvette Wolff RN  Outcome: Progressing     Problem: Nutrition Deficit:  Goal: Optimize nutritional status  7/20/2025 2119 by Kristine Oden RN  Outcome: Progressing  7/20/2025 1023 by Yvette Wolff RN  Outcome: Progressing     Problem: Confusion  Goal: Confusion, delirium, dementia, or psychosis is improved or at baseline  Description: INTERVENTIONS:  1. Assess for possible contributors to thought disturbance, including medications, impaired vision or hearing, underlying metabolic abnormalities, dehydration, psychiatric diagnoses, and notify attending LIP  2. [place] high risk fall precautions, as indicated  3. Provide frequent short contacts to provide reality reorientation, refocusing and direction  4. Decrease environmental stimuli, including noise as appropriate  5. Monitor and intervene to maintain adequate nutrition, hydration, elimination, sleep and activity  6. If unable to ensure safety without constant attention obtain sitter and review sitter guidelines with assigned personnel  7. Initiate Psychosocial CNS and Spiritual Care consult, as indicated  7/20/2025 2119 by Kristine Oden RN  Outcome: Progressing  7/20/2025 1023 by Yvette Wolff RN  Outcome: Progressing     Problem: Skin/Tissue Integrity  Goal: Skin integrity remains intact  Description: 1.  Monitor for areas of redness and/or skin breakdown  2.  Assess vascular access sites hourly  3.  Every 4-6 hours minimum:  Change oxygen saturation probe site  4.  Every 4-6 hours:  If on nasal continuous positive airway pressure, respiratory therapy assess nares and determine need for appliance change

## 2025-07-21 NOTE — PROGRESS NOTES
Physical Therapy  Facility/Department: [address] INTERMEDIATE  Physical Therapy Initial Assessment    Name: Anthony Tam  : 1965  MRN: [id number]  Date of Service: 2025          Patient Diagnosis(es): The primary encounter diagnosis was Acute on chronic respiratory failure with hypercapnia (HCC). Diagnoses of Multiple closed fractures of metatarsal bone of right foot, initial encounter, COPD exacerbation (HCC), Multiple falls, Failure to thrive in adult, Noncompliance with treatment, and Bacteremia due to Gram-positive bacteria were also pertinent to this visit.  Past Medical History:  has no past medical history on file.  Past Surgical History:  has no past surgical history on file.      Evaluating Therapist: Alannah Solorio PT      Room #:  0644/0644-A  Diagnosis:  Noncompliance with treatment [Z91.199]  COPD exacerbation (HCC) [J44.1]  Failure to thrive in adult [R62.7]  Multiple falls [R29.6]  Acute on chronic respiratory failure (HCC) [J96.20]  Acute on chronic respiratory failure with hypercapnia (HCC) [J96.22]  Multiple closed fractures of metatarsal bone of right foot, initial encounter [S92.301A]  PMHx/PSHx:  COPD  Precautions:  falls, alarm, PWB R LE with boot, o2    Social:  Pt lives with sister in a 1 floor plan but lives in the basement. Pt independent with ww      Initial Evaluation  Date: 25 Treatment      Short Term/ Long Term   Goals   Was pt agreeable to Eval/treatment? yes     Does pt have pain? None reported     Bed Mobility  Rolling: SBA  Supine to sit: SBA  Sit to supine: SBA  Scooting: SBA  Independent    Transfers Sit to stand: min assist  Stand to sit: min assist   Stand pivot: min assist  SBA   Ambulation    15 feet with ww with min assist  100 feet with ww with SBA   Stair Negotiation  Ascended and descended  NT   4-12 steps with 1 rail with SBA   LE strength     3+/5    4/5   balance      fair     AM-PAC Raw score               16         Pt is alert and Oriented   LE ROM:

## 2025-07-21 NOTE — PROGRESS NOTES
SPEECH/LANGUAGE PATHOLOGY  CLINICAL ASSESSMENT OF SWALLOWING FUNCTION   and PLAN OF CARE      PATIENT NAME:  Anthony Tam  (male)     MRN:  [id number]    :  1965  (60 y.o.)  STATUS:  Inpatient: Room 0644/0644-A    TODAY'S DATE:  2025  ORDER DATE, DESCRIPTION AND REFERRING PROVIDER: Dr. Brown  REASON FOR REFERRAL: assess swallow function   EVALUATING THERAPIST: Mitra Michelle                 RESULTS:    DYSPHAGIA DIAGNOSIS:   functional oropharyngeal swallow for age/premorbid functioning    Patient given thin liquids, puree, and hard solids at this time. Patient reports no difficulty swallowing. Throat clearing x2 noted with thin liquids. No other overt s/s of aspiration noted at this time. Aspiration unable to be ruled out at the bedside.       DIET RECOMMENDATIONS:  Regular consistency solids (IDDSI level 7) with  thin liquids (IDDSI level 0)     FEEDING RECOMMENDATIONS:     Assistance level:  No assistance needed      Compensatory strategies recommended: Fully alert for all PO, Thorough oral care to prevent colonization of oral bacteria, Upright in bed/ chair as tolerated  Slow rate of intake   SINGLE cup sips  SINGLE straw sips   SMALL bites      Discussed recommendations with:  Patient  and patient nurse in person    SPEECH THERAPY  PLAN OF CARE   The dysphagia POC is established based on physician order, dysphagia diagnosis and results of clinical assessment     Meal time assessment for 1-2 sessions to provide diet modification and compensatory strategy implementation due to need to ensure proper implementation of compensatory strategies during PO intake     Conditions Requiring Skilled Therapeutic Intervention for dysphagia:    Patient is performing below functional baseline d/t  current acute condition, respiratory compromise, multiple medications, and/or increased dependency upon caregivers.    Specific dysphagia interventions to include:     ongoing mealtime assessment to provide diet  exacerbation (HCC)    Colonic polyp    Disorientation, unspecified    Dysphagia    Gastroesophageal reflux disease    Heavy tobacco smoker    Immunoglobulin A deficiency (HCC)    Insomnia    Ventricular arrhythmia    Acute on chronic respiratory failure with hypercapnia (HCC)    Severe protein-energy malnutrition    Lactic acidosis    Acute on chronic respiratory failure (HCC)         CPT code:  35999  bedside swallow shivani Michelle  Speech Pathology Graduate Extern    Ally Knox M.S. CCC-SLP/L  Speech Language Pathologist  [id number]

## 2025-07-21 NOTE — PROGRESS NOTES
Physician Progress Note      PATIENT:               CYNDEE BAL  CSN #:                  [id number]  :                       1965  ADMIT DATE:       7/15/2025 10:47 PM  DISCH DATE:  RESPONDING  PROVIDER #:        Caleb Brown MD          QUERY TEXT:    The attending physician is required to clarify conflicting documentation in   the medical record.  Noted documentation of severe and moderate malnutrition   in IM Progress Notes by Caleb Brown MD at 2025 .  Based on your   medical judgement, please clarify the following:    The clinical indicators include:  60 years old male with COPD, HTN, Respiratory failure , cachexia    -\"Protein/calorie malnutrition, BMI 14\"-pulmonology Progress Notes by Joe Burnett DO at 2025    \" Moderate Protein Calorie Malnutrition: POA, BMI is 14.51 with recent weight   loss, loss of deltoid, temporalis and interosseous musculature on exam as well   as subcutaneous fat. Active Problems: Severe protein-energy malnutrition \"-IM   Progress Notes by Caleb Brown MD at 2025    \"Malnutrition Status:  Severe malnutrition (25 1631)  Context:  Chronic Illness  Findings of the 6 clinical characteristics of malnutrition:  Energy Intake:  Mild decrease in energy intake (suspect non protein calories)  Weight Loss:  No weight loss (has gained some wt but still below IBW for   ht/age.)  Body Fat Loss:  Severe body fat loss Triceps  Muscle Mass Loss:  Severe muscle mass loss Clavicles (pectoralis & deltoids),   Scapula (trapezius)  Fluid Accumulation:  No fluid accumulation   Strength:  Not Performed  Current Body Weight: 52.2 kg  Current BMI (kg/m2): 15.6\"-Dietition Progress Notes by Stephie Hernández, RD, LD   at 2025    -Continue current diet, Continue STD ONS TID.    Thank you    Luma Chatman [facility]  ,  CDS  Options provided:  -- After Study, severe malnutrition confirmed and moderate malnutrition  ruled   out  -- After Study, moderate

## 2025-07-21 NOTE — PROGRESS NOTES
Occupational Therapy    OCCUPATIONAL THERAPY INITIAL EVALUATION    [facility]   [address] [place], [place]         Date:2025                                                  Patient Name: Anthony Tam    MRN: [id number]    : 1965    Room: [address] [place] [address]      Evaluating OT: Yasmin Heath OTR/L   [id number]      Referring Provider:    Romeo Plaza DO       Specific Provider Orders/Date:OT eval and treat 2025      Diagnosis:  Noncompliance with treatment [Z91.199]  COPD exacerbation (HCC) [J44.1]  Failure to thrive in adult [R62.7]  Multiple falls [R29.6]  Acute on chronic respiratory failure (HCC) [J96.20]  Acute on chronic respiratory failure with hypercapnia (HCC) [J96.22]  Multiple closed fractures of metatarsal bone of right foot, initial encounter [S92.301A]    Per podiatry note:  PLAN:  Right foot x-rays: 3-4 metatarsal base fractures, distal shaft fifth metatarsal fracture, avulsion fracture of the base of the first metatarsal, avulsion fracture of the head of the proximal phalanx of the hallux, Lisfranc injury, soft tissue swelling  - Weightbearing: PWB to Right leg in boot  - Surgical intervention is not planned.     Pertinent Medical History:  COPD,alcohol abuse, GERD, tobacco use disorder, insomnia, malnutrition    Precautions:  Fall Risk, PWB R LE in boot, O2, [place] , continuous pule ox     Assessment of current deficits    [x] Functional mobility  [x]ADLs  [x] Strength               [x]Cognition    [x] Functional transfers   [x] IADLs         [x] Safety Awareness   [x]Endurance    [] Fine Coordination              [x] Balance      [] Vision/perception   []Sensation     []Gross Motor Coordination  [] ROM  [] Delirium                   [] Motor Control     OT PLAN OF CARE   OT POC based on physician orders, patient diagnosis and results of clinical assessment    Frequency/Duration  2-3 days/wk for 2 - 4weeks PRN   Specific OT Treatment

## 2025-07-21 NOTE — CARE COORDINATION
Social Work / Discharge PLanning :SW noted order for NIV. Awaiting therapy input to better assist with discharge planning.  [employer] optum insurance. Most likely will need SNF placement. SW to follow. Electronically signed by STEVE Bourgeois on 7/21/25 at 10:39 AM EDT     Addendum: SW met with patient brother and mother via Phone. Goals at d/c discussed Goal at discharge is Rehab. NIV ordered . AWait therapy. Once completed, will discuss SNF choices that VA approved. Once an accepting SNF,will need to contact Darius Gilbert at [facility] to start approval. SW to follow. Electronically signed by STEVE Bourgeois on 7/21/25 at 11:14 AM EDT     Addendum: PT 16/24 and OT 15/24. SW updated ann marie Roth. In agreement for  rehab inpatient. Did ask about [facility] in house rehab. REferrals sent out in [facility] to see who has a bed and accepts  [place] optum. WiIl update patient and family with SNF that can accept. SW to follow. Electronically signed by STEVE Bourgeois on 7/21/25 at 2:36 PM EDT     Addendum: SW reached out to several SNF with several accepting. However, some are stating he does NOT have VA benefits but Medicare # [id number]. SIRI placed a call to Nora from Public benefits to verify active medicare. SIRI also placed a call and left detailed message for Darius Gilbert at the [facility] to verify IF he does have SNF benefits under his VA. Await response from both. SIRI to follow. Electronically signed by STEVE Bourgeois on 7/21/25 at 3:35 PM EDT

## 2025-07-21 NOTE — PROGRESS NOTES
Neptali Archer M.D.,[facility]  Ghulam Retana D.O., IRENE., [facility]  Muriel Angulo M.D.  Kristen Hess M.D.   Joe Burnett D.O.  Yonis Marques M.D.         Daily Pulmonary Progress Note    Patient:  Anthony Tam 60 y.o. male MRN: [id number]            Synopsis     We are following patient for acute on chronic hypercapnic respiratory failure secondary to AECOPD    \"CC\" failure to thrive    Code status: FULL CODE      Subjective   7/16/25: Clifford was seen today sitting up in bed on his baseline oxygen of 4 L. He is awake, alert and oriented. He is extremely thin and cachectic. He tells me his father recently passed away and at the 1 month anniversary he became extremely depressed and quit taking all of his medications. He states that he understands that he messed up. He reports that his COPD typically does not cause him many exacerbations. His lungs on exam are tight and wheezy.     7/17/25: Patient was seen and examined at bedside this morning. Awake, alert and oriented x 3 this morning. Repeat ABG this morning showed improvement in hypercapnia, continues to be elevated. Saturating at 96% on 4L of O2 on NC.     7/18/25: Patient seen and examined lying in bed on his baseline of 4 L.  Lungs are still wheezy but are improved nicely.  He is complaining of foot pain.    7/21/25: Examined at the bedside.  Alert with quick appropriate responses this morning.  Wearing AVAPS.  CO2 on serology this morning 41.  Hgb 7.9.  Family present, speaking with case management.  Hoping for transition to ECF and continued use of AVAPS moving forward.  Orders for equipment pending as his disposition at this point is not determined.      Review of Systems:  Constitutional: Denies fever, weight loss, night sweats, and fatigue  Skin: Denies pigmentation, dark lesions, and rashes   HEENT: Denies hearing loss, tinnitus, ear drainage, epistaxis, sore throat, and hoarseness.  Cardiovascular: Denies palpitations, chest pain, and

## 2025-07-22 LAB
ANION GAP SERPL CALCULATED.3IONS-SCNC: 9 MMOL/L (ref 7–16)
BASOPHILS # BLD: 0.02 K/UL (ref 0–0.2)
BASOPHILS NFR BLD: 0 % (ref 0–2)
BUN SERPL-MCNC: 14 MG/DL (ref 8–23)
CALCIUM SERPL-MCNC: 9.4 MG/DL (ref 8.8–10.2)
CHLORIDE SERPL-SCNC: 95 MMOL/L (ref 98–107)
CO2 SERPL-SCNC: 40 MMOL/L (ref 22–29)
CREAT SERPL-MCNC: 0.5 MG/DL (ref 0.7–1.2)
EOSINOPHIL # BLD: 0.04 K/UL (ref 0.05–0.5)
EOSINOPHILS RELATIVE PERCENT: 1 % (ref 0–6)
ERYTHROCYTE [DISTWIDTH] IN BLOOD BY AUTOMATED COUNT: 13.1 % (ref 11.5–15)
GFR, ESTIMATED: >90 ML/MIN/1.73M2
GLUCOSE BLD-MCNC: 101 MG/DL (ref 74–99)
GLUCOSE BLD-MCNC: 130 MG/DL (ref 74–99)
GLUCOSE BLD-MCNC: 150 MG/DL (ref 74–99)
GLUCOSE BLD-MCNC: 99 MG/DL (ref 74–99)
GLUCOSE SERPL-MCNC: 100 MG/DL (ref 74–99)
HCT VFR BLD AUTO: 26.9 % (ref 37–54)
HGB BLD-MCNC: 8.3 G/DL (ref 12.5–16.5)
IMM GRANULOCYTES # BLD AUTO: 0.04 K/UL (ref 0–0.58)
IMM GRANULOCYTES NFR BLD: 1 % (ref 0–5)
LYMPHOCYTES NFR BLD: 1.6 K/UL (ref 1.5–4)
LYMPHOCYTES RELATIVE PERCENT: 25 % (ref 20–42)
MCH RBC QN AUTO: 32.8 PG (ref 26–35)
MCHC RBC AUTO-ENTMCNC: 30.9 G/DL (ref 32–34.5)
MCV RBC AUTO: 106.3 FL (ref 80–99.9)
MONOCYTES NFR BLD: 0.57 K/UL (ref 0.1–0.95)
MONOCYTES NFR BLD: 9 % (ref 2–12)
NEUTROPHILS NFR BLD: 65 % (ref 43–80)
NEUTS SEG NFR BLD: 4.12 K/UL (ref 1.8–7.3)
PLATELET # BLD AUTO: 224 K/UL (ref 130–450)
PMV BLD AUTO: 10.6 FL (ref 7–12)
POTASSIUM SERPL-SCNC: 4.2 MMOL/L (ref 3.5–5.1)
RBC # BLD AUTO: 2.53 M/UL (ref 3.8–5.8)
SODIUM SERPL-SCNC: 144 MMOL/L (ref 136–145)
WBC OTHER # BLD: 6.4 K/UL (ref 4.5–11.5)

## 2025-07-22 PROCEDURE — 92526 ORAL FUNCTION THERAPY: CPT

## 2025-07-22 PROCEDURE — 6370000000 HC RX 637 (ALT 250 FOR IP): Performed by: INTERNAL MEDICINE

## 2025-07-22 PROCEDURE — 6370000000 HC RX 637 (ALT 250 FOR IP)

## 2025-07-22 PROCEDURE — 85025 COMPLETE CBC W/AUTO DIFF WBC: CPT

## 2025-07-22 PROCEDURE — 94660 CPAP INITIATION&MGMT: CPT

## 2025-07-22 PROCEDURE — 6360000002 HC RX W HCPCS: Performed by: HOSPITALIST

## 2025-07-22 PROCEDURE — 6370000000 HC RX 637 (ALT 250 FOR IP): Performed by: HOSPITALIST

## 2025-07-22 PROCEDURE — 80048 BASIC METABOLIC PNL TOTAL CA: CPT

## 2025-07-22 PROCEDURE — 2060000000 HC ICU INTERMEDIATE R&B

## 2025-07-22 PROCEDURE — 82962 GLUCOSE BLOOD TEST: CPT

## 2025-07-22 PROCEDURE — 99232 SBSQ HOSP IP/OBS MODERATE 35: CPT | Performed by: STUDENT IN AN ORGANIZED HEALTH CARE EDUCATION/TRAINING PROGRAM

## 2025-07-22 PROCEDURE — 2700000000 HC OXYGEN THERAPY PER DAY

## 2025-07-22 PROCEDURE — 6360000002 HC RX W HCPCS

## 2025-07-22 PROCEDURE — 2500000003 HC RX 250 WO HCPCS: Performed by: HOSPITALIST

## 2025-07-22 PROCEDURE — 94640 AIRWAY INHALATION TREATMENT: CPT

## 2025-07-22 RX ADMIN — ACETAMINOPHEN 650 MG: 325 TABLET ORAL at 18:29

## 2025-07-22 RX ADMIN — IPRATROPIUM BROMIDE AND ALBUTEROL SULFATE 1 DOSE: 2.5; .5 SOLUTION RESPIRATORY (INHALATION) at 20:12

## 2025-07-22 RX ADMIN — METOPROLOL SUCCINATE 25 MG: 25 TABLET, EXTENDED RELEASE ORAL at 08:28

## 2025-07-22 RX ADMIN — IPRATROPIUM BROMIDE AND ALBUTEROL SULFATE 1 DOSE: 2.5; .5 SOLUTION RESPIRATORY (INHALATION) at 16:28

## 2025-07-22 RX ADMIN — IPRATROPIUM BROMIDE AND ALBUTEROL SULFATE 1 DOSE: 2.5; .5 SOLUTION RESPIRATORY (INHALATION) at 12:54

## 2025-07-22 RX ADMIN — IPRATROPIUM BROMIDE AND ALBUTEROL SULFATE 1 DOSE: 2.5; .5 SOLUTION RESPIRATORY (INHALATION) at 08:46

## 2025-07-22 RX ADMIN — BUDESONIDE 500 MCG: 0.5 SUSPENSION RESPIRATORY (INHALATION) at 08:46

## 2025-07-22 RX ADMIN — BUDESONIDE 500 MCG: 0.5 SUSPENSION RESPIRATORY (INHALATION) at 20:12

## 2025-07-22 RX ADMIN — SODIUM CHLORIDE, PRESERVATIVE FREE 10 ML: 5 INJECTION INTRAVENOUS at 20:56

## 2025-07-22 RX ADMIN — ARFORMOTEROL TARTRATE 15 MCG: 15 SOLUTION RESPIRATORY (INHALATION) at 20:12

## 2025-07-22 RX ADMIN — MULTIVITAMIN TABLET 1 TABLET: TABLET at 08:27

## 2025-07-22 RX ADMIN — ACETAMINOPHEN 650 MG: 325 TABLET ORAL at 12:49

## 2025-07-22 RX ADMIN — Medication 100 MG: at 08:28

## 2025-07-22 RX ADMIN — FOLIC ACID 1 MG: 1 TABLET ORAL at 08:28

## 2025-07-22 RX ADMIN — ARFORMOTEROL TARTRATE 15 MCG: 15 SOLUTION RESPIRATORY (INHALATION) at 08:46

## 2025-07-22 RX ADMIN — PREDNISONE 30 MG: 20 TABLET ORAL at 08:28

## 2025-07-22 RX ADMIN — SODIUM CHLORIDE, PRESERVATIVE FREE 10 ML: 5 INJECTION INTRAVENOUS at 08:36

## 2025-07-22 RX ADMIN — ENOXAPARIN SODIUM 40 MG: 100 INJECTION SUBCUTANEOUS at 08:26

## 2025-07-22 ASSESSMENT — PAIN SCALES - GENERAL
PAINLEVEL_OUTOF10: 9
PAINLEVEL_OUTOF10: 5
PAINLEVEL_OUTOF10: 0
PAINLEVEL_OUTOF10: 9

## 2025-07-22 ASSESSMENT — PAIN - FUNCTIONAL ASSESSMENT
PAIN_FUNCTIONAL_ASSESSMENT: ACTIVITIES ARE NOT PREVENTED
PAIN_FUNCTIONAL_ASSESSMENT: ACTIVITIES ARE NOT PREVENTED

## 2025-07-22 ASSESSMENT — PAIN DESCRIPTION - ORIENTATION
ORIENTATION: MID
ORIENTATION: MID

## 2025-07-22 ASSESSMENT — PAIN DESCRIPTION - DESCRIPTORS
DESCRIPTORS: POUNDING
DESCRIPTORS: NAGGING;PRESSURE

## 2025-07-22 ASSESSMENT — PAIN DESCRIPTION - LOCATION
LOCATION: HEAD
LOCATION: HEAD

## 2025-07-22 NOTE — PROGRESS NOTES
[facility] Hospitalist Progress Note    Admitting Date and Time: 7/15/2025 10:47 PM  Admit Dx: Noncompliance with treatment [Z91.199]  COPD exacerbation (HCC) [J44.1]  Failure to thrive in adult [R62.7]  Multiple falls [R29.6]  Acute on chronic respiratory failure (HCC) [J96.20]  Acute on chronic respiratory failure with hypercapnia (HCC) [J96.22]  Multiple closed fractures of metatarsal bone of right foot, initial encounter [S92.301A]    Subjective:  Patient is being followed for Noncompliance with treatment [Z91.199]  COPD exacerbation (HCC) [J44.1]  Failure to thrive in adult [R62.7]  Multiple falls [R29.6]  Acute on chronic respiratory failure (HCC) [J96.20]  Acute on chronic respiratory failure with hypercapnia (HCC) [J96.22]  Multiple closed fractures of metatarsal bone of right foot, initial encounter [S92.301A]     Patient seen and evaluated, he is resting in bed on NIV. He continues to have shortness of breath but notes it is slightly better. He feels fatigued. No other concerns or complaints at this time.     ROS: Pertinent findings stated above. Denies dizziness, diaphoresis, fever, chills, chest pain, palpitations, cough, abdominal pain, nausea, vomiting, dysuria, hematuria, melena, hematochezia, diarrhea, or constipation.      enoxaparin  40 mg SubCUTAneous Daily    nicotine  1 patch TransDERmal Daily    predniSONE  30 mg Oral Daily    Followed by    [START ON 7/25/2025] predniSONE  20 mg Oral Daily    Followed by    [START ON 7/28/2025] predniSONE  10 mg Oral Daily    insulin lispro  0-4 Units SubCUTAneous 4x Daily AC & HS    metoprolol succinate  25 mg Oral Daily    sodium chloride flush  5-40 mL IntraVENous 2 times per day    multivitamin  1 tablet Oral Daily    folic acid  1 mg Oral Daily    thiamine  100 mg Oral Daily    ipratropium 0.5 mg-albuterol 2.5 mg  1 Dose Inhalation Q4H WA RT    arformoterol tartrate  15 mcg Nebulization BID RT    budesonide  0.5 mg Nebulization BID RT

## 2025-07-22 NOTE — PROGRESS NOTES
SPEECH LANGUAGE PATHOLOGY  DAILY PROGRESS NOTE      PATIENT NAME:  Anthony Tam      :  1965          TODAY'S DATE:  2025 ROOM:  [facility]    Current Diet: ADULT DIET; Regular  ADULT ORAL NUTRITION SUPPLEMENT; Dinner, Lunch, Breakfast; Standard High Calorie/High Protein Oral Supplement    Patient seen for ongoing dysphagia tx. Patient seen sitting at the edge of bed with lunch meal upon SLP arrival. Patient agreeable to trial thin liquids, puree, and solids from tray this date. Patient noted with fast rate of intake, which he reported to be baseline. Oral phase of the swallow appeared to be within functional limits, as there was good mastication and good oral clearance. Pharyngeal phase noted with no overt s/s of aspiration at this time. It should be noted, silent aspiration can not be ruled out at the bedside, and if silent aspiration is suspected based on clinical correlation an MBSS would be recommended.     Recommendation: continue regular diet; continue implementation of compensatory strategies      CPT code(s) 19030  dysphagia tx  Total minutes :  10 minutes    Mitra Michelle  Speech Pathology Graduate Extern    Ally Knox M.S. CCC-SLP/L  Speech Language Pathologist  [id number]

## 2025-07-22 NOTE — PLAN OF CARE
Problem: Seizure Precautions  Goal: Remains free of injury related to seizures activity  Outcome: Progressing     Problem: Safety - Adult  Goal: Free from fall injury  Outcome: Progressing     Problem: Nutrition Deficit:  Goal: Optimize nutritional status  Outcome: Progressing     Problem: Confusion  Goal: Confusion, delirium, dementia, or psychosis is improved or at baseline  Description: INTERVENTIONS:  1. Assess for possible contributors to thought disturbance, including medications, impaired vision or hearing, underlying metabolic abnormalities, dehydration, psychiatric diagnoses, and notify attending LIP  2. [place] high risk fall precautions, as indicated  3. Provide frequent short contacts to provide reality reorientation, refocusing and direction  4. Decrease environmental stimuli, including noise as appropriate  5. Monitor and intervene to maintain adequate nutrition, hydration, elimination, sleep and activity  6. If unable to ensure safety without constant attention obtain sitter and review sitter guidelines with assigned personnel  7. Initiate Psychosocial CNS and Spiritual Care consult, as indicated  Outcome: Progressing     Problem: Skin/Tissue Integrity  Goal: Skin integrity remains intact  Description: 1.  Monitor for areas of redness and/or skin breakdown  2.  Assess vascular access sites hourly  3.  Every 4-6 hours minimum:  Change oxygen saturation probe site  4.  Every 4-6 hours:  If on nasal continuous positive airway pressure, respiratory therapy assess nares and determine need for appliance change or resting period  Outcome: Progressing     Problem: ABCDS Injury Assessment  Goal: Absence of physical injury  Outcome: Progressing

## 2025-07-22 NOTE — PROGRESS NOTES
Occupational Therapy    Attempted OT treatment, patient declined, requesting to leave NIV face mask on and rest. OT to re-attempt at a later date/time as able and appropriate.     Erendira Alvarez OTR/L  License Number: [id number]

## 2025-07-22 NOTE — PROGRESS NOTES
Comprehensive Nutrition Assessment    Type and Reason for Visit:  Reassess    Nutrition Recommendations/Plan:   Continue Current High Calorie High Protein ONS TID  Continue Diet Per Orders  Continue Inpatient Monitoring     Malnutrition Assessment:  Malnutrition Status:  Severe malnutrition (07/17/25 1631)    Context:  Chronic Illness     Findings of the 6 clinical characteristics of malnutrition:  Energy Intake:  Mild decrease in energy intake (suspect non protein calories)  Weight Loss:  No weight loss (has gained some wt but still below IBW for ht/age.)     Body Fat Loss:  Severe body fat loss Triceps   Muscle Mass Loss:  Severe muscle mass loss Clavicles (pectoralis & deltoids), Scapula (trapezius)  Fluid Accumulation:  Unable to assess (Multifactorial)     Strength:  Not Performed    Nutrition Assessment:    Pt lying in bed, reports feeling well, states he ate good appetite for B today, likes the High Calorie High Protein ONS-100% intake, Continue ONS TID to ^kcals/protein/optimize nutr, promote healing    Nutrition Related Findings:    I/O -5.1L, A/O x4, +BS +BM 7/22, Edema: Trace RLE/+2 LLE, NIV-AVAPS-Chr Resp Failure/End-Stage COPD, CO2 40, , POC glu 130, prednisone, folic acid, insulin, MVI, thiamine, SLP following dysphagia T, multiple R foot Fx  R leg-boot no surgical intervention planned Wound Type: None (sacrum, lower back-redness)       Current Nutrition Intake & Therapies:    Average Meal Intake: 51-75%  Average Supplements Intake: %  ADULT DIET; Regular  ADULT ORAL NUTRITION SUPPLEMENT; Dinner, Lunch, Breakfast; Standard High Calorie/High Protein Oral Supplement    Anthropometric Measures:  Height: 182.9 cm (6')  Ideal Body Weight (IBW): 178 lbs (81 kg)    Admission Body Weight:  (107# stated on adm 7/15.)  Current Body Weight: 56.9 kg (125 lb 7.1 oz) (wt gain 4.7kg x 5d), 64.7 % IBW. Weight Source: Bed scale (7/22)  Current BMI (kg/m2): 17  Usual Body Weight: 49.7 kg (109 lb 8 oz)

## 2025-07-22 NOTE — DISCHARGE INSTR - COC
Continuity of Care Form    Patient Name: Anthony Tam   :  1965  MRN:  [id number]    Admit date:  7/15/2025  Discharge date:      Code Status Order: Full Code   Advance Directives:     Admitting Physician:  Frederic Hardy DO  PCP: No primary care provider on file.    Discharging Nurse: Hilary Ibarra RN  Discharging Hospital Unit/Room#: 0644/0644-A  Discharging Unit Phone Number: [phone]    Emergency Contact:   Extended Emergency Contact Information  Primary Emergency Contact: Sommer Tello  Address: [address] [place] [address] of [facility]  Home Phone: [phone]  Mobile Phone: [phone]  Relation: Other  Preferred language: English   needed? No  Secondary Emergency Contact: Faisal Tam  Home Phone: [phone]  Mobile Phone: [phone]  Relation: Brother/Sister    Past Surgical History:  No past surgical history on file.    Immunization History:     There is no immunization history on file for this patient.    Active Problems:  Patient Active Problem List   Diagnosis Code    Vitamin D deficiency E55.9    Alcohol abuse F10.10    COPD exacerbation ([facility]) J44.1    Colonic polyp K63.5    Disorientation, unspecified R41.0    Dysphagia R13.10    Gastroesophageal reflux disease K21.9    Heavy tobacco smoker F17.200    Immunoglobulin A deficiency ([facility]) D80.2    Insomnia G47.00    Ventricular arrhythmia I49.9    Acute on chronic respiratory failure with hypercapnia ([facility]) J96.22    Severe protein-energy malnutrition E43    Lactic acidosis E87.20    Acute on chronic respiratory failure ([facility]) J96.20       Isolation/Infection:   Isolation            No Isolation          Patient Infection Status    None to display              Nurse Assessment:  Last Vital Signs: BP (!) 118/58   Pulse (!) 105   Temp 98.4 °F (36.9 °C) (Axillary)   Resp 20   Ht 1.829 m (6')   Wt 56.4 kg (124 lb 5.4 oz)   SpO2 100%   BMI 16.86 kg/m²     Last documented pain  score (0-10 scale): Pain Level: 0  Last Weight:   Wt Readings from Last 1 Encounters:   07/21/25 56.4 kg (124 lb 5.4 oz)     Mental Status:  oriented and alert    IV Access:  - None    Nursing Mobility/ADLs:  Walking   Assisted  Transfer  Assisted  Bathing  Assisted  Dressing  Assisted  Toileting  Assisted  Feeding  Independent  Med Admin  Independent  Med Delivery   none    Wound Care Documentation and Therapy:        Elimination:  Continence:   Bowel: Yes  Bladder: Yes  Urinary Catheter: None   Colostomy/Ileostomy/Ileal Conduit: No       Date of Last BM: 7/22/25    Intake/Output Summary (Last 24 hours) at 7/22/2025 1158  Last data filed at 7/22/2025 0424  Gross per 24 hour   Intake --   Output 700 ml   Net -700 ml     I/O last 3 completed shifts:  In: 5 [I.V.:5]  Out: 2800 [Urine:2800]    Safety Concerns:     At Risk for Falls    Impairments/Disabilities:      None    Nutrition Therapy:  Current Nutrition Therapy:   - Oral Diet:  General    Routes of Feeding: Oral  Liquids: Thin Liquids  Daily Fluid Restriction: no  Last Modified Barium Swallow with Video (Video Swallowing Test): not done    Treatments at the Time of Hospital Discharge:   Respiratory Treatments: Brovana, Pulmicort, Duoneb  Oxygen Therapy:  is on oxygen at 4 L/min per nasal cannula.  Ventilator:    - AVAPS    Rehab Therapies: Physical Therapy, Occupational Therapy, and Speech/Language Therapy  Weight Bearing Status/Restrictions: Partial weight bearing (30-50%) only on leg right leg  Other Medical Equipment (for information only, NOT a DME order):  walker and boot for right leg  Other Treatments:     Patient's personal belongings (please select all that are sent with patient):  None    RN SIGNATURE:  Electronically signed by Hilary Ibarra RN on 7/24/25 at 11:32 AM EDT    CASE MANAGEMENT/SOCIAL WORK SECTION    Inpatient Status Date: ***    Readmission Risk Assessment Score:  BS RISK OF UNPLANNED READMISSION 2.0             13.7 Total Score

## 2025-07-22 NOTE — CARE COORDINATION
Social Work / Discharge Planning :Nora from [employer] verified active Medicare A and B. SW did update daughter. SW did speak to [facility] and they do NOT have inpatient rehab. SW did gather a list for daughter to provide top three choices for rehab facilities that can accept under medicare benefits. Many did accept patient. Await choices. NO pre-cert needed. Did not have a preference for [facility] and referral went to Sadie at [facility]. AWait choices. SW to follow. Electronically signed by STEVE Bourgeois on 7/22/25 at 10:09 AM EDT     Addendum: SW obtained rehab choices: 1.) [facility] 2.) [facility] and 3.) [facility]. SW did reach out to [facility] to see if they have a bed. Await acceptance/ denial. SW to follow. Electronically signed by STEVE Bourgeois on 7/22/25 at 11:04 AM EDT     Addendum: Darius Gilbert from [facility] updated SW the last [facility] patient had contact with is [facility] and he has been to many [facility] throughout the [place]. Darius verified patient is % service connected and does NOT have SNF benefit. Fortunately, patient does have medicare A and B for his SNF stay. [facility] CAN accept. N 17, HENS and transport forms completed. SW to follow. Electronically signed by STEVE Bourgeois on 7/22/25 at 11:52 AM EDT

## 2025-07-22 NOTE — PROGRESS NOTES
Neptali Archer M.D.,[facility]  Ghulam Retana D.O., IRENE., [facility]  Muriel Angulo M.D.  Kristen Hess M.D.   Joe Burnett D.O.  Yonis Marques M.D.         Daily Pulmonary Progress Note    Patient:  Anthony Tam 60 y.o. male MRN: [id number]            Synopsis     We are following patient for acute on chronic hypercapnic respiratory failure secondary to AECOPD    \"CC\" failure to thrive    Code status: FULL CODE      Subjective   7/16/25: Clifford was seen today sitting up in bed on his baseline oxygen of 4 L. He is awake, alert and oriented. He is extremely thin and cachectic. He tells me his father recently passed away and at the 1 month anniversary he became extremely depressed and quit taking all of his medications. He states that he understands that he messed up. He reports that his COPD typically does not cause him many exacerbations. His lungs on exam are tight and wheezy.     7/17/25: Patient was seen and examined at bedside this morning. Awake, alert and oriented x 3 this morning. Repeat ABG this morning showed improvement in hypercapnia, continues to be elevated. Saturating at 96% on 4L of O2 on NC.     7/18/25: Patient seen and examined lying in bed on his baseline of 4 L.  Lungs are still wheezy but are improved nicely.  He is complaining of foot pain.    7/21/25: Examined at the bedside.  Alert with quick appropriate responses this morning.  Wearing AVAPS.  CO2 on serology this morning 41.  Hgb 7.9.  Family present, speaking with case management.  Hoping for transition to ECF and continued use of AVAPS moving forward.  Orders for equipment pending as his disposition at this point is not determined.    7/22/2025: Examined at the bedside, sleeping.  Wearing AVAPS.  Case management working on placement, VA contact utilized to assist this process.  Chasidy has been consulted for AVAPS equipment moving forward.      Review of Systems:  Constitutional: Denies fever, weight loss, night sweats, and

## 2025-07-23 ENCOUNTER — APPOINTMENT (OUTPATIENT)
Dept: CT IMAGING | Age: 60
DRG: 190 | End: 2025-07-23
Payer: MEDICARE

## 2025-07-23 LAB
ANION GAP SERPL CALCULATED.3IONS-SCNC: 11 MMOL/L (ref 7–16)
BASOPHILS # BLD: 0.02 K/UL (ref 0–0.2)
BASOPHILS NFR BLD: 0 % (ref 0–2)
BUN SERPL-MCNC: 19 MG/DL (ref 8–23)
CALCIUM SERPL-MCNC: 9.1 MG/DL (ref 8.8–10.2)
CHLORIDE SERPL-SCNC: 94 MMOL/L (ref 98–107)
CO2 SERPL-SCNC: 36 MMOL/L (ref 22–29)
CREAT SERPL-MCNC: 0.5 MG/DL (ref 0.7–1.2)
EOSINOPHIL # BLD: 0.06 K/UL (ref 0.05–0.5)
EOSINOPHILS RELATIVE PERCENT: 1 % (ref 0–6)
ERYTHROCYTE [DISTWIDTH] IN BLOOD BY AUTOMATED COUNT: 13.7 % (ref 11.5–15)
FERRITIN SERPL-MCNC: 140 NG/ML
GFR, ESTIMATED: >90 ML/MIN/1.73M2
GLUCOSE BLD-MCNC: 110 MG/DL (ref 74–99)
GLUCOSE BLD-MCNC: 113 MG/DL (ref 74–99)
GLUCOSE BLD-MCNC: 129 MG/DL (ref 74–99)
GLUCOSE BLD-MCNC: 172 MG/DL (ref 74–99)
GLUCOSE SERPL-MCNC: 94 MG/DL (ref 74–99)
HCT VFR BLD AUTO: 25.1 % (ref 37–54)
HGB BLD-MCNC: 8 G/DL (ref 12.5–16.5)
IMM GRANULOCYTES # BLD AUTO: 0.03 K/UL (ref 0–0.58)
IMM GRANULOCYTES NFR BLD: 1 % (ref 0–5)
IRON SATN MFR SERPL: 15 % (ref 20–55)
IRON SERPL-MCNC: 42 UG/DL (ref 61–157)
LYMPHOCYTES NFR BLD: 1.67 K/UL (ref 1.5–4)
LYMPHOCYTES RELATIVE PERCENT: 31 % (ref 20–42)
MCH RBC QN AUTO: 32.9 PG (ref 26–35)
MCHC RBC AUTO-ENTMCNC: 31.9 G/DL (ref 32–34.5)
MCV RBC AUTO: 103.3 FL (ref 80–99.9)
MONOCYTES NFR BLD: 0.55 K/UL (ref 0.1–0.95)
MONOCYTES NFR BLD: 10 % (ref 2–12)
NEUTROPHILS NFR BLD: 57 % (ref 43–80)
NEUTS SEG NFR BLD: 3.09 K/UL (ref 1.8–7.3)
PLATELET # BLD AUTO: 254 K/UL (ref 130–450)
PMV BLD AUTO: 10.1 FL (ref 7–12)
POTASSIUM SERPL-SCNC: 4.4 MMOL/L (ref 3.5–5.1)
RBC # BLD AUTO: 2.43 M/UL (ref 3.8–5.8)
SODIUM SERPL-SCNC: 140 MMOL/L (ref 136–145)
TIBC SERPL-MCNC: 274 UG/DL (ref 250–450)
WBC OTHER # BLD: 5.4 K/UL (ref 4.5–11.5)

## 2025-07-23 PROCEDURE — 2500000003 HC RX 250 WO HCPCS: Performed by: HOSPITALIST

## 2025-07-23 PROCEDURE — 36415 COLL VENOUS BLD VENIPUNCTURE: CPT

## 2025-07-23 PROCEDURE — 80048 BASIC METABOLIC PNL TOTAL CA: CPT

## 2025-07-23 PROCEDURE — 6360000004 HC RX CONTRAST MEDICATION: Performed by: RADIOLOGY

## 2025-07-23 PROCEDURE — 6370000000 HC RX 637 (ALT 250 FOR IP)

## 2025-07-23 PROCEDURE — 82962 GLUCOSE BLOOD TEST: CPT

## 2025-07-23 PROCEDURE — 97530 THERAPEUTIC ACTIVITIES: CPT

## 2025-07-23 PROCEDURE — 2060000000 HC ICU INTERMEDIATE R&B

## 2025-07-23 PROCEDURE — 82728 ASSAY OF FERRITIN: CPT

## 2025-07-23 PROCEDURE — 6360000002 HC RX W HCPCS

## 2025-07-23 PROCEDURE — 85025 COMPLETE CBC W/AUTO DIFF WBC: CPT

## 2025-07-23 PROCEDURE — 83540 ASSAY OF IRON: CPT

## 2025-07-23 PROCEDURE — 6370000000 HC RX 637 (ALT 250 FOR IP): Performed by: INTERNAL MEDICINE

## 2025-07-23 PROCEDURE — 94660 CPAP INITIATION&MGMT: CPT

## 2025-07-23 PROCEDURE — 2700000000 HC OXYGEN THERAPY PER DAY

## 2025-07-23 PROCEDURE — 6360000002 HC RX W HCPCS: Performed by: HOSPITALIST

## 2025-07-23 PROCEDURE — 74178 CT ABD&PLV WO CNTR FLWD CNTR: CPT

## 2025-07-23 PROCEDURE — 83550 IRON BINDING TEST: CPT

## 2025-07-23 PROCEDURE — 6370000000 HC RX 637 (ALT 250 FOR IP): Performed by: HOSPITALIST

## 2025-07-23 PROCEDURE — 99232 SBSQ HOSP IP/OBS MODERATE 35: CPT | Performed by: STUDENT IN AN ORGANIZED HEALTH CARE EDUCATION/TRAINING PROGRAM

## 2025-07-23 PROCEDURE — 94640 AIRWAY INHALATION TREATMENT: CPT

## 2025-07-23 RX ORDER — IOPAMIDOL 755 MG/ML
75 INJECTION, SOLUTION INTRAVASCULAR
Status: COMPLETED | OUTPATIENT
Start: 2025-07-23 | End: 2025-07-23

## 2025-07-23 RX ORDER — PANTOPRAZOLE SODIUM 40 MG/1
40 TABLET, DELAYED RELEASE ORAL
Status: DISCONTINUED | OUTPATIENT
Start: 2025-07-24 | End: 2025-07-24 | Stop reason: HOSPADM

## 2025-07-23 RX ADMIN — Medication 100 MG: at 09:05

## 2025-07-23 RX ADMIN — ARFORMOTEROL TARTRATE 15 MCG: 15 SOLUTION RESPIRATORY (INHALATION) at 20:52

## 2025-07-23 RX ADMIN — IPRATROPIUM BROMIDE AND ALBUTEROL SULFATE 1 DOSE: 2.5; .5 SOLUTION RESPIRATORY (INHALATION) at 12:07

## 2025-07-23 RX ADMIN — BUDESONIDE 500 MCG: 0.5 SUSPENSION RESPIRATORY (INHALATION) at 08:08

## 2025-07-23 RX ADMIN — SODIUM CHLORIDE, PRESERVATIVE FREE 10 ML: 5 INJECTION INTRAVENOUS at 09:02

## 2025-07-23 RX ADMIN — IPRATROPIUM BROMIDE AND ALBUTEROL SULFATE 1 DOSE: 2.5; .5 SOLUTION RESPIRATORY (INHALATION) at 20:52

## 2025-07-23 RX ADMIN — MULTIVITAMIN TABLET 1 TABLET: TABLET at 09:04

## 2025-07-23 RX ADMIN — ARFORMOTEROL TARTRATE 15 MCG: 15 SOLUTION RESPIRATORY (INHALATION) at 08:08

## 2025-07-23 RX ADMIN — BUDESONIDE 500 MCG: 0.5 SUSPENSION RESPIRATORY (INHALATION) at 20:52

## 2025-07-23 RX ADMIN — METOPROLOL SUCCINATE 25 MG: 25 TABLET, EXTENDED RELEASE ORAL at 09:04

## 2025-07-23 RX ADMIN — IPRATROPIUM BROMIDE AND ALBUTEROL SULFATE 1 DOSE: 2.5; .5 SOLUTION RESPIRATORY (INHALATION) at 08:08

## 2025-07-23 RX ADMIN — PREDNISONE 30 MG: 20 TABLET ORAL at 09:03

## 2025-07-23 RX ADMIN — FOLIC ACID 1 MG: 1 TABLET ORAL at 09:04

## 2025-07-23 RX ADMIN — IPRATROPIUM BROMIDE AND ALBUTEROL SULFATE 1 DOSE: 2.5; .5 SOLUTION RESPIRATORY (INHALATION) at 16:21

## 2025-07-23 RX ADMIN — ACETAMINOPHEN 650 MG: 325 TABLET ORAL at 06:35

## 2025-07-23 RX ADMIN — IOPAMIDOL 75 ML: 755 INJECTION, SOLUTION INTRAVENOUS at 15:09

## 2025-07-23 RX ADMIN — ENOXAPARIN SODIUM 40 MG: 100 INJECTION SUBCUTANEOUS at 09:04

## 2025-07-23 ASSESSMENT — PAIN DESCRIPTION - LOCATION
LOCATION: FOOT
LOCATION: HEAD

## 2025-07-23 ASSESSMENT — PAIN DESCRIPTION - ORIENTATION
ORIENTATION: RIGHT
ORIENTATION: MID

## 2025-07-23 ASSESSMENT — PAIN SCALES - GENERAL
PAINLEVEL_OUTOF10: 5
PAINLEVEL_OUTOF10: 5
PAINLEVEL_OUTOF10: 0
PAINLEVEL_OUTOF10: 0

## 2025-07-23 ASSESSMENT — PAIN DESCRIPTION - DESCRIPTORS
DESCRIPTORS: ACHING
DESCRIPTORS: ACHING

## 2025-07-23 NOTE — PROGRESS NOTES
[facility] Hospitalist Progress Note    Admitting Date and Time: 7/15/2025 10:47 PM  Admit Dx: Noncompliance with treatment [Z91.199]  COPD exacerbation (HCC) [J44.1]  Failure to thrive in adult [R62.7]  Multiple falls [R29.6]  Acute on chronic respiratory failure (HCC) [J96.20]  Acute on chronic respiratory failure with hypercapnia (HCC) [J96.22]  Multiple closed fractures of metatarsal bone of right foot, initial encounter [S92.301A]    Subjective:  Patient is being followed for Noncompliance with treatment [Z91.199]  COPD exacerbation (HCC) [J44.1]  Failure to thrive in adult [R62.7]  Multiple falls [R29.6]  Acute on chronic respiratory failure (HCC) [J96.20]  Acute on chronic respiratory failure with hypercapnia (HCC) [J96.22]  Multiple closed fractures of metatarsal bone of right foot, initial encounter [S92.301A]     Patient seen and evaluated. He is off BiPAP and on NC. Patient continues to have shortness of breath but notes its improving. He also states he had a dark, tarry, putty like bowel movement. Per patient he does have a hx of ulcers and believes he is due for colonoscopy.     ROS: Pertinent findings stated above. Denies dizziness, diaphoresis, fever, chills, chest pain, palpitations, cough, abdominal pain, nausea, vomiting, dysuria, hematuria, diarrhea, or constipation.      enoxaparin  40 mg SubCUTAneous Daily    nicotine  1 patch TransDERmal Daily    predniSONE  30 mg Oral Daily    Followed by    [START ON 7/25/2025] predniSONE  20 mg Oral Daily    Followed by    [START ON 7/28/2025] predniSONE  10 mg Oral Daily    insulin lispro  0-4 Units SubCUTAneous 4x Daily AC & HS    metoprolol succinate  25 mg Oral Daily    sodium chloride flush  5-40 mL IntraVENous 2 times per day    multivitamin  1 tablet Oral Daily    folic acid  1 mg Oral Daily    thiamine  100 mg Oral Daily    ipratropium 0.5 mg-albuterol 2.5 mg  1 Dose Inhalation Q4H WA RT    arformoterol tartrate  15 mcg Nebulization BID RT

## 2025-07-23 NOTE — PROGRESS NOTES
Physical Therapy  Facility/Department: [address] INTERMEDIATE  Physical Therapy Treatment Note    Name: Anthony Tam  : 1965  MRN: [id number]  Date of Service: 2025          Patient Diagnosis(es): The primary encounter diagnosis was Acute on chronic respiratory failure with hypercapnia (HCC). Diagnoses of Multiple closed fractures of metatarsal bone of right foot, initial encounter, COPD exacerbation (HCC), Multiple falls, Failure to thrive in adult, Noncompliance with treatment, and Bacteremia due to Gram-positive bacteria were also pertinent to this visit.  Past Medical History:  has no past medical history on file.  Past Surgical History:  has no past surgical history on file.      Evaluating Therapist: Alannah Solorio PT      Room #:  0644/0644-A  Diagnosis:  Noncompliance with treatment [Z91.199]  COPD exacerbation (HCC) [J44.1]  Failure to thrive in adult [R62.7]  Multiple falls [R29.6]  Acute on chronic respiratory failure (HCC) [J96.20]  Acute on chronic respiratory failure with hypercapnia (HCC) [J96.22]  Multiple closed fractures of metatarsal bone of right foot, initial encounter [S92.301A]  PMHx/PSHx:  COPD  Precautions:  falls, alarm, PWB R LE with boot, o2    Social:  Pt lives with sister in a 1 floor plan but lives in the basement. Pt independent with ww      Initial Evaluation  Date: 25 Treatment  2025    Short Term/ Long Term   Goals   Was pt agreeable to Eval/treatment? yes yes    Does pt have pain? None reported No complaints    Bed Mobility  Rolling: SBA  Supine to sit: SBA  Sit to supine: SBA  Scooting: SBA Rolling: SBA  Supine <> sit: SBA  Scooting: SBA seated to EOB Independent    Transfers Sit to stand: min assist  Stand to sit: min assist   Stand pivot: min assist Sit < >stand: SBA SBA   Ambulation    15 feet with ww with min assist Side stepping 4 feet along EOB SBA using WW, PWB R LE observed 100 feet with ww with SBA   Stair Negotiation  Ascended and descended  NT NT.

## 2025-07-23 NOTE — PROGRESS NOTES
Department of Podiatry  Progress Note    SUBJECTIVE:  Anthony Tam is seen at bedside for multiple right foot fractures. No acute events overnight. CPAP is on and running appropriately. Patient states he's doing well and pain is manageable. States he hasn't been up walking around.    OBJECTIVE:    Scheduled Meds:   enoxaparin  40 mg SubCUTAneous Daily    nicotine  1 patch TransDERmal Daily    predniSONE  30 mg Oral Daily    Followed by    [START ON 7/25/2025] predniSONE  20 mg Oral Daily    Followed by    [START ON 7/28/2025] predniSONE  10 mg Oral Daily    insulin lispro  0-4 Units SubCUTAneous 4x Daily AC & HS    metoprolol succinate  25 mg Oral Daily    sodium chloride flush  5-40 mL IntraVENous 2 times per day    multivitamin  1 tablet Oral Daily    folic acid  1 mg Oral Daily    thiamine  100 mg Oral Daily    ipratropium 0.5 mg-albuterol 2.5 mg  1 Dose Inhalation Q4H WA RT    arformoterol tartrate  15 mcg Nebulization BID RT    budesonide  0.5 mg Nebulization BID RT     Continuous Infusions:   dextrose      sodium chloride       PRN Meds:.phenylephrine-mineral oil-petrolatum, glucagon (rDNA), glucose, dextrose bolus **OR** dextrose bolus, dextrose, ondansetron **OR** ondansetron, polyethylene glycol, acetaminophen **OR** acetaminophen, benzonatate, sodium chloride flush, sodium chloride, LORazepam **OR** [DISCONTINUED] LORazepam **OR** LORazepam **OR** [DISCONTINUED] LORazepam **OR** LORazepam **OR** [DISCONTINUED] LORazepam **OR** LORazepam **OR** [DISCONTINUED] LORazepam, albuterol, melatonin    No Known Allergies    /64   Pulse (!) 105   Temp 98.1 °F (36.7 °C) (Axillary)   Resp 20   Ht 1.829 m (6')   Wt 56.8 kg (125 lb 3.5 oz)   SpO2 100%   BMI 16.98 kg/m²       EXAM:    VASCULAR:  DP and PT pulses are palpable. CFT < 5 seconds B/L.  Warm to warm from the tibial tuberosity to the distal aspect of the digits dorsally. Hair growth not noted to the distal aspects dorsally.     NEUROLOGIC:  Light

## 2025-07-23 NOTE — PROGRESS NOTES
Occupational Therapy  OT BEDSIDE TREATMENT NOTE      Date:2025  Patient Name: Anthony Tam  MRN: [id number]  : 1965  Room: [address] [place], [place] [address]A      Evaluating OT: Yasmin Heath OTR/L   [id number]       Referring Provider:    Romeo Plaza DO        Specific Provider Orders/Date:OT eval and treat 2025       Diagnosis:  Noncompliance with treatment [Z91.199]  COPD exacerbation (HCC) [J44.1]  Failure to thrive in adult [R62.7]  Multiple falls [R29.6]  Acute on chronic respiratory failure (HCC) [J96.20]  Acute on chronic respiratory failure with hypercapnia (HCC) [J96.22]  Multiple closed fractures of metatarsal bone of right foot, initial encounter [S92.301A]    Per podiatry note:  PLAN:  Right foot x-rays: 3-4 metatarsal base fractures, distal shaft fifth metatarsal fracture, avulsion fracture of the base of the first metatarsal, avulsion fracture of the head of the proximal phalanx of the hallux, Lisfranc injury, soft tissue swelling  - Weightbearing: PWB to Right leg in boot  - Surgical intervention is not planned.     Pertinent Medical History:  COPD,alcohol abuse, GERD, tobacco use disorder, insomnia, malnutrition    Precautions:  Fall Risk, PWB R LE in boot, O2, [place] , continuous pule ox      Assessment of current deficits    [x] Functional mobility            [x]ADLs           [x] Strength                  [x]Cognition    [x] Functional transfers          [x] IADLs         [x] Safety Awareness   [x]Endurance    [] Fine Coordination                         [x] Balance      [] Vision/perception   []Sensation      []Gross Motor Coordination             [] ROM           [] Delirium                   [] Motor Control      OT PLAN OF CARE   OT POC based on physician orders, patient diagnosis and results of clinical assessment     Frequency/Duration  2-3 days/wk for 2 - 4weeks PRN   Specific OT Treatment Interventions to include:   ADL retraining/adapted techniques and AE recommendations to increase functional  with boot  SBA  with good tolerance    Balance Sitting:     Static:  Independent    Dynamic:SBA   Standing: Min A  Standing balance CGA with WW  Independent/supervision    Activity Tolerance No SOB observed   On 5 L   O2 sats high  90s Limited d/t HR 120s-130s seated EOB, nursing notified and requested patient return to bed, patient side stepped to [facility] for positioning and returned to supine. Nursing remained in room. O2 sat 96 on nc O2  Good  with ADL activity    Visual/  Perceptual Glasses: none by bedside           UE ROM/strength  AROM presen throughout   functional use of UEs Tolerate UE therapeutic activity/exercises to increase strength/endurance for ADL/xfer activity        Comments:  patient cleared with nursing and agreeable to session, however during session patient found to have increased HR, nursing was notified and session was limited. Patient returned to bed with nursing present in room    Education/treatment: ADL and functional transfer/activity performed to increase safety and independence during self care tasks. Education provided on safety awareness, adl reeducation, functional transfer training    Pt has made limited progress towards set goals.     Time In: 8:39  Time Out: 8:52     Min Units   Therapeutic Ex [id number]     Therapeutic Activities 48696 13 1   ADL/Self Care [id number]     Orthotic Management 31133     Neuro Re-Ed [id number]     Non-Billable Time     TOTAL TIMED TREATMENT 13 1       Geri HA/MARLON [id number]

## 2025-07-23 NOTE — CARE COORDINATION
Social Work / Discharge PLanning : PLan is [facility] SNF under his Medicare benefit. NO pre-cert needed. Pulmonology CNP clarified use of device. They ONLY want him to wear at night and for naps. Patient and staff updated. Chasidy Noel  is following for NIV when patient leaves [facility]. AWait discharge when medically stable. SW to follow. Electronically signed by STEVE Bourgeois on 7/23/25 at 10:02 AM EDT

## 2025-07-23 NOTE — PROGRESS NOTES
Neptali Archer M.D.,[facility]  Ghulam Retana D.O., IRENE., [facility]  Muriel Angulo M.D.  Kristen Hess M.D.   Joe Burnett D.O.  Yonis Marques M.D.         Daily Pulmonary Progress Note    Patient:  Anthony Tam 60 y.o. male MRN: [id number]            Synopsis     We are following patient for acute on chronic hypercapnic respiratory failure secondary to AECOPD    \"CC\" failure to thrive    Code status: FULL CODE      Subjective   7/16/25: Clifford was seen today sitting up in bed on his baseline oxygen of 4 L. He is awake, alert and oriented. He is extremely thin and cachectic. He tells me his father recently passed away and at the 1 month anniversary he became extremely depressed and quit taking all of his medications. He states that he understands that he messed up. He reports that his COPD typically does not cause him many exacerbations. His lungs on exam are tight and wheezy.     7/17/25: Patient was seen and examined at bedside this morning. Awake, alert and oriented x 3 this morning. Repeat ABG this morning showed improvement in hypercapnia, continues to be elevated. Saturating at 96% on 4L of O2 on NC.     7/18/25: Patient seen and examined lying in bed on his baseline of 4 L.  Lungs are still wheezy but are improved nicely.  He is complaining of foot pain.    7/21/25: Examined at the bedside.  Alert with quick appropriate responses this morning.  Wearing AVAPS.  CO2 on serology this morning 41.  Hgb 7.9.  Family present, speaking with case management.  Hoping for transition to ECF and continued use of AVAPS moving forward.  Orders for equipment pending as his disposition at this point is not determined.    7/22/2025: Examined at the bedside, sleeping.  Wearing AVAPS.  Case management working on placement, [facility] contact utilized to assist this process.  Chasidy has been consulted for AVAPS equipment moving forward.    7/23/25: Examined at the bedside.  Nursing staff appreciate upward trend of  study with SLP  DYSPHAGIA DIAGNOSIS:   functional oropharyngeal swallow for age/premorbid functioning     Patient given thin liquids, puree, and hard solids at this time. Patient reports no difficulty swallowing. Throat clearing x2 noted with thin liquids. No other overt s/s of aspiration noted at this time. Aspiration unable to be ruled out at the bedside.                           DIET RECOMMENDATIONS:  Regular consistency solids (IDDSI level 7) with  thin liquids (IDDSI level 0)                FEEDING RECOMMENDATIONS:                         Assistance level:  No assistance needed      Labs:  Lab Results   Component Value Date/Time    WBC 5.4 07/23/2025 06:08 AM    RBC 2.43 07/23/2025 06:08 AM    HGB 8.0 07/23/2025 06:08 AM    HCT 25.1 07/23/2025 06:08 AM    .3 07/23/2025 06:08 AM    MCH 32.9 07/23/2025 06:08 AM    MCHC 31.9 07/23/2025 06:08 AM    RDW 13.7 07/23/2025 06:08 AM     07/23/2025 06:08 AM    MPV 10.1 07/23/2025 06:08 AM     Lab Results   Component Value Date/Time     07/23/2025 06:08 AM    K 4.4 07/23/2025 06:08 AM    CL 94 07/23/2025 06:08 AM    CO2 36 07/23/2025 06:08 AM    BUN 19 07/23/2025 06:08 AM    CREATININE 0.5 07/23/2025 06:08 AM    CALCIUM 9.1 07/23/2025 06:08 AM    LABGLOM >90 07/23/2025 06:08 AM       No results for input(s): \"PROBNP\" in the last 72 hours.      No results for input(s): \"PROCAL\" in the last 72 hours.     Latest Reference Range & Units 07/16/25 01:42 07/16/25 03:21 07/16/25 10:28 07/17/25 05:25   Source:  Blood Arterial Blood Arterial Blood Arterial Blood Arterial   pH, Blood Gas 7.350 - 7.450  7.219 (L) 7.312 (L) 7.381 7.386   PCO2 35.0 - 45.0 mmHg 126.6 (HH) 99.7 (HH) 89.2 (HH) 77.3 (HH)   pO2 75.0 - 100.0 mmHg <20.0 (LL) 468.1 (H) 144.2 (H) 50.1 (L)   HCO3 22.0 - 26.0 mmol/L 50.5 (H) 49.3 (H) 51.7 (H) 45.3 (H)   Base Excess -3.0 - 3.0 mmol/L 18.1 (H) 19.3 (H) 22.8 (H) 17.6 (H)   O2 Sat 92.0 - 98.5 % 24.3 (L) 99.8 (H) 98.9 (H) 83.7 (L)   THB,THB 11.5 -

## 2025-07-23 NOTE — CONSULTS
[facility] Consult Note   SARIKA Tsai with Armen Delgadillo D.O. Consult Note      Date of Service: 2025  Reason for Consult: dark tarry stools, abdominal pain   Requesting Physician: Dr. Tobias     CHIEF COMPLAINT:  weakness, falls and SOB     History Obtained From:  patient, electronic medical record    HISTORY OF PRESENT ILLNESS:       Anthony Tam is a 60 y.o. male with no significant past medical history presenting to ED for FTT and SOB and admitted with COPD exaserbation.  Pt reports he was feeling weak and SOB at home leading to ER for evaluation. Pt has been admitted since 7/15 and seen by pulmonary and podiatry. Pt had complaints of abdominal pain and black stools therefore our service consulted. Pt without current complaints of abdominal pain or nausea. Seen eating lunch, tolerating. Denies GERD or dysphagia. States he has hx of gastric ulcer approximately 5 years ago, unable to give details [facility]. Last colonoscopy around the same time, reportedly normal. No constipation or diarrhea reported. Reports he had a black stool yesterday, none prior to admission. Denies use of anticoagulants or NSAIDS.  Pt use tobacco 1PPD. Reports hx alcohol use. No sig FMH.    Admission labs: hgb 10.7.   Labs today: hgb 8.0    Consultation for abdominal/dark tarry stools. Currently, pt reports no abd pain or nausea. Tolerating diet. No BM today.      Past Medical History:    No past medical history on file.  Past Surgical History:    No past surgical history on file.  Current Medications:    Current Facility-Administered Medications: phenylephrine-mineral oil-petrolatum (PREPARATION H) rectal ointment, , Rectal, BID PRN  enoxaparin (LOVENOX) injection 40 mg, 40 mg, SubCUTAneous, Daily  nicotine (NICODERM CQ) 7 MG/24HR 1 patch, 1 patch, TransDERmal, Daily  [] predniSONE (DELTASONE) tablet 40 mg, 40 mg, Oral, Daily **FOLLOWED BY** predniSONE

## 2025-07-23 NOTE — PLAN OF CARE
Problem: Seizure Precautions  Goal: Remains free of injury related to seizures activity  Outcome: Progressing     Problem: Safety - Adult  Goal: Free from fall injury  Outcome: Progressing     Problem: Nutrition Deficit:  Goal: Optimize nutritional status  Outcome: Progressing  Flowsheets (Taken 7/22/2025 1518 by Terri Swan RD)  Nutrient intake appropriate for improving, restoring, or maintaining nutritional needs:   Assess nutritional status and recommend course of action   Monitor oral intake, labs, and treatment plans   Recommend appropriate diets, oral nutritional supplements, and vitamin/mineral supplements     Problem: Confusion  Goal: Confusion, delirium, dementia, or psychosis is improved or at baseline  Description: INTERVENTIONS:  1. Assess for possible contributors to thought disturbance, including medications, impaired vision or hearing, underlying metabolic abnormalities, dehydration, psychiatric diagnoses, and notify attending LIP  2. [place] high risk fall precautions, as indicated  3. Provide frequent short contacts to provide reality reorientation, refocusing and direction  4. Decrease environmental stimuli, including noise as appropriate  5. Monitor and intervene to maintain adequate nutrition, hydration, elimination, sleep and activity  6. If unable to ensure safety without constant attention obtain sitter and review sitter guidelines with assigned personnel  7. Initiate Psychosocial CNS and Spiritual Care consult, as indicated  Outcome: Progressing     Problem: Skin/Tissue Integrity  Goal: Skin integrity remains intact  Description: 1.  Monitor for areas of redness and/or skin breakdown  2.  Assess vascular access sites hourly  3.  Every 4-6 hours minimum:  Change oxygen saturation probe site  4.  Every 4-6 hours:  If on nasal continuous positive airway pressure, respiratory therapy assess nares and determine need for appliance change or resting period  Outcome: Progressing     Problem:

## 2025-07-24 VITALS
WEIGHT: 125.22 LBS | OXYGEN SATURATION: 100 % | HEART RATE: 99 BPM | DIASTOLIC BLOOD PRESSURE: 71 MMHG | BODY MASS INDEX: 16.96 KG/M2 | RESPIRATION RATE: 18 BRPM | HEIGHT: 72 IN | SYSTOLIC BLOOD PRESSURE: 97 MMHG | TEMPERATURE: 97.5 F

## 2025-07-24 LAB
ANION GAP SERPL CALCULATED.3IONS-SCNC: 10 MMOL/L (ref 7–16)
BASOPHILS # BLD: 0.01 K/UL (ref 0–0.2)
BASOPHILS NFR BLD: 0 % (ref 0–2)
BUN SERPL-MCNC: 21 MG/DL (ref 8–23)
CALCIUM SERPL-MCNC: 9.2 MG/DL (ref 8.8–10.2)
CHLORIDE SERPL-SCNC: 96 MMOL/L (ref 98–107)
CO2 SERPL-SCNC: 37 MMOL/L (ref 22–29)
CREAT SERPL-MCNC: 0.5 MG/DL (ref 0.7–1.2)
EOSINOPHIL # BLD: 0.06 K/UL (ref 0.05–0.5)
EOSINOPHILS RELATIVE PERCENT: 1 % (ref 0–6)
ERYTHROCYTE [DISTWIDTH] IN BLOOD BY AUTOMATED COUNT: 13.9 % (ref 11.5–15)
GFR, ESTIMATED: >90 ML/MIN/1.73M2
GLUCOSE BLD-MCNC: 112 MG/DL (ref 74–99)
GLUCOSE BLD-MCNC: 221 MG/DL (ref 74–99)
GLUCOSE SERPL-MCNC: 99 MG/DL (ref 74–99)
HCT VFR BLD AUTO: 26.6 % (ref 37–54)
HGB BLD-MCNC: 8.1 G/DL (ref 12.5–16.5)
IMM GRANULOCYTES # BLD AUTO: <0.03 K/UL (ref 0–0.58)
IMM GRANULOCYTES NFR BLD: 0 % (ref 0–5)
LYMPHOCYTES NFR BLD: 1.48 K/UL (ref 1.5–4)
LYMPHOCYTES RELATIVE PERCENT: 30 % (ref 20–42)
MCH RBC QN AUTO: 33.1 PG (ref 26–35)
MCHC RBC AUTO-ENTMCNC: 30.5 G/DL (ref 32–34.5)
MCV RBC AUTO: 108.6 FL (ref 80–99.9)
MONOCYTES NFR BLD: 0.46 K/UL (ref 0.1–0.95)
MONOCYTES NFR BLD: 9 % (ref 2–12)
NEUTROPHILS NFR BLD: 59 % (ref 43–80)
NEUTS SEG NFR BLD: 2.97 K/UL (ref 1.8–7.3)
PLATELET # BLD AUTO: 270 K/UL (ref 130–450)
PMV BLD AUTO: 10.2 FL (ref 7–12)
POTASSIUM SERPL-SCNC: 4 MMOL/L (ref 3.5–5.1)
RBC # BLD AUTO: 2.45 M/UL (ref 3.8–5.8)
SODIUM SERPL-SCNC: 143 MMOL/L (ref 136–145)
WBC OTHER # BLD: 5 K/UL (ref 4.5–11.5)

## 2025-07-24 PROCEDURE — 82962 GLUCOSE BLOOD TEST: CPT

## 2025-07-24 PROCEDURE — 6370000000 HC RX 637 (ALT 250 FOR IP): Performed by: HOSPITALIST

## 2025-07-24 PROCEDURE — 99239 HOSP IP/OBS DSCHRG MGMT >30: CPT | Performed by: STUDENT IN AN ORGANIZED HEALTH CARE EDUCATION/TRAINING PROGRAM

## 2025-07-24 PROCEDURE — 94660 CPAP INITIATION&MGMT: CPT

## 2025-07-24 PROCEDURE — 6370000000 HC RX 637 (ALT 250 FOR IP)

## 2025-07-24 PROCEDURE — 85025 COMPLETE CBC W/AUTO DIFF WBC: CPT

## 2025-07-24 PROCEDURE — 80048 BASIC METABOLIC PNL TOTAL CA: CPT

## 2025-07-24 PROCEDURE — 6370000000 HC RX 637 (ALT 250 FOR IP): Performed by: INTERNAL MEDICINE

## 2025-07-24 PROCEDURE — 2700000000 HC OXYGEN THERAPY PER DAY

## 2025-07-24 PROCEDURE — 6360000002 HC RX W HCPCS

## 2025-07-24 PROCEDURE — 6370000000 HC RX 637 (ALT 250 FOR IP): Performed by: NURSE PRACTITIONER

## 2025-07-24 PROCEDURE — 2500000003 HC RX 250 WO HCPCS: Performed by: HOSPITALIST

## 2025-07-24 PROCEDURE — 6360000002 HC RX W HCPCS: Performed by: HOSPITALIST

## 2025-07-24 PROCEDURE — 94640 AIRWAY INHALATION TREATMENT: CPT

## 2025-07-24 RX ORDER — PREDNISONE 10 MG/1
TABLET ORAL
Qty: 9 TABLET | Refills: 0 | Status: ON HOLD | OUTPATIENT
Start: 2025-07-25 | End: 2025-08-03

## 2025-07-24 RX ORDER — PANTOPRAZOLE SODIUM 40 MG/1
40 TABLET, DELAYED RELEASE ORAL
Qty: 30 TABLET | Refills: 3 | Status: ON HOLD | OUTPATIENT
Start: 2025-07-25

## 2025-07-24 RX ADMIN — INSULIN LISPRO 1 UNITS: 100 INJECTION, SOLUTION INTRAVENOUS; SUBCUTANEOUS at 11:35

## 2025-07-24 RX ADMIN — ARFORMOTEROL TARTRATE 15 MCG: 15 SOLUTION RESPIRATORY (INHALATION) at 07:59

## 2025-07-24 RX ADMIN — ENOXAPARIN SODIUM 40 MG: 100 INJECTION SUBCUTANEOUS at 08:18

## 2025-07-24 RX ADMIN — FOLIC ACID 1 MG: 1 TABLET ORAL at 08:19

## 2025-07-24 RX ADMIN — PREDNISONE 30 MG: 20 TABLET ORAL at 08:19

## 2025-07-24 RX ADMIN — SODIUM CHLORIDE, PRESERVATIVE FREE 10 ML: 5 INJECTION INTRAVENOUS at 08:19

## 2025-07-24 RX ADMIN — IPRATROPIUM BROMIDE AND ALBUTEROL SULFATE 1 DOSE: 2.5; .5 SOLUTION RESPIRATORY (INHALATION) at 12:35

## 2025-07-24 RX ADMIN — METOPROLOL SUCCINATE 25 MG: 25 TABLET, EXTENDED RELEASE ORAL at 08:19

## 2025-07-24 RX ADMIN — BUDESONIDE 500 MCG: 0.5 SUSPENSION RESPIRATORY (INHALATION) at 07:59

## 2025-07-24 RX ADMIN — IPRATROPIUM BROMIDE AND ALBUTEROL SULFATE 1 DOSE: 2.5; .5 SOLUTION RESPIRATORY (INHALATION) at 07:59

## 2025-07-24 RX ADMIN — ACETAMINOPHEN 650 MG: 325 TABLET ORAL at 03:46

## 2025-07-24 RX ADMIN — Medication 100 MG: at 08:19

## 2025-07-24 RX ADMIN — PANTOPRAZOLE SODIUM 40 MG: 40 TABLET, DELAYED RELEASE ORAL at 05:44

## 2025-07-24 RX ADMIN — MULTIVITAMIN TABLET 1 TABLET: TABLET at 08:19

## 2025-07-24 ASSESSMENT — PAIN SCALES - GENERAL
PAINLEVEL_OUTOF10: 0
PAINLEVEL_OUTOF10: 0
PAINLEVEL_OUTOF10: 9

## 2025-07-24 ASSESSMENT — PAIN DESCRIPTION - LOCATION: LOCATION: FOOT

## 2025-07-24 ASSESSMENT — PAIN DESCRIPTION - DESCRIPTORS: DESCRIPTORS: NAGGING;SORE

## 2025-07-24 ASSESSMENT — PAIN DESCRIPTION - ORIENTATION: ORIENTATION: RIGHT

## 2025-07-24 NOTE — PROGRESS NOTES
Attempted to notify [facility] for nurse to nurse with no answer. Discharge papers, STEPHEN and MAR report faxed to [phone]

## 2025-07-24 NOTE — PROGRESS NOTES
Department of Podiatry  Progress Note    SUBJECTIVE:  Anthony Tam is seen at bedside for multiple right foot fractures. No acute events overnight. CPAP is on and running appropriately. Patient states he's doing well and denies any pain this morning. States he's been staying in bed, so he hasn't been using his boot for ambulation.    OBJECTIVE:    Scheduled Meds:   pantoprazole  40 mg Oral QAM AC    enoxaparin  40 mg SubCUTAneous Daily    nicotine  1 patch TransDERmal Daily    [START ON 7/25/2025] predniSONE  20 mg Oral Daily    Followed by    [START ON 7/28/2025] predniSONE  10 mg Oral Daily    insulin lispro  0-4 Units SubCUTAneous 4x Daily AC & HS    metoprolol succinate  25 mg Oral Daily    sodium chloride flush  5-40 mL IntraVENous 2 times per day    multivitamin  1 tablet Oral Daily    folic acid  1 mg Oral Daily    thiamine  100 mg Oral Daily    ipratropium 0.5 mg-albuterol 2.5 mg  1 Dose Inhalation Q4H WA RT    arformoterol tartrate  15 mcg Nebulization BID RT    budesonide  0.5 mg Nebulization BID RT     Continuous Infusions:   dextrose      sodium chloride       PRN Meds:.phenylephrine-mineral oil-petrolatum, glucagon (rDNA), glucose, dextrose bolus **OR** dextrose bolus, dextrose, ondansetron **OR** ondansetron, polyethylene glycol, acetaminophen **OR** acetaminophen, benzonatate, sodium chloride flush, sodium chloride, LORazepam **OR** [DISCONTINUED] LORazepam **OR** LORazepam **OR** [DISCONTINUED] LORazepam **OR** LORazepam **OR** [DISCONTINUED] LORazepam **OR** LORazepam **OR** [DISCONTINUED] LORazepam, albuterol, melatonin    No Known Allergies    BP (!) 105/55   Pulse (!) 101   Temp 97.3 °F (36.3 °C) (Axillary)   Resp 20   Ht 1.829 m (6')   Wt 56.8 kg (125 lb 3.5 oz)   SpO2 100%   BMI 16.98 kg/m²       EXAM:    VASCULAR:  DP and PT pulses are palpable. CFT < 5 seconds B/L.  Warm to warm from the tibial tuberosity to the distal aspect of the digits dorsally. Hair growth not noted to the

## 2025-07-24 NOTE — PROGRESS NOTES
Dr. Marques notified via [facility] regarding discharge. Per Dr. Marques patient okay to discharge.

## 2025-07-24 NOTE — CARE COORDINATION
Social Work / Discharge PLanning :Patient will be discharged to [facility] SNF today between 1:30 and 3:30 via [facility]. Patient daughter Ira updated. Patient currently sleeping. Nursing and [facility] informed. Lilia from [facility] verified they have NIV for patient. SW to follow. Electronically signed by STEVE Bourgeois on 7/24/25 at 11:11 AM EDT

## 2025-07-25 ENCOUNTER — APPOINTMENT (OUTPATIENT)
Dept: GENERAL RADIOLOGY | Age: 60
DRG: 191 | End: 2025-07-25
Payer: MEDICARE

## 2025-07-25 ENCOUNTER — HOSPITAL ENCOUNTER (INPATIENT)
Age: 60
LOS: 4 days | Discharge: NURSING, REHAB OR LONG-TERM CARE | DRG: 191 | End: 2025-07-29
Attending: EMERGENCY MEDICINE | Admitting: STUDENT IN AN ORGANIZED HEALTH CARE EDUCATION/TRAINING PROGRAM
Payer: MEDICARE

## 2025-07-25 ENCOUNTER — APPOINTMENT (OUTPATIENT)
Dept: CT IMAGING | Age: 60
DRG: 191 | End: 2025-07-25
Payer: MEDICARE

## 2025-07-25 DIAGNOSIS — R00.2 PALPITATIONS: ICD-10-CM

## 2025-07-25 DIAGNOSIS — Z72.0 TOBACCO ABUSE: ICD-10-CM

## 2025-07-25 DIAGNOSIS — J43.9 PULMONARY EMPHYSEMA, UNSPECIFIED EMPHYSEMA TYPE (HCC): ICD-10-CM

## 2025-07-25 DIAGNOSIS — J44.1 COPD WITH ACUTE EXACERBATION (HCC): Primary | ICD-10-CM

## 2025-07-25 PROBLEM — R00.0 SINUS TACHYCARDIA: Status: ACTIVE | Noted: 2025-07-25

## 2025-07-25 PROBLEM — R06.02 SHORTNESS OF BREATH: Status: ACTIVE | Noted: 2025-07-25

## 2025-07-25 LAB
ALBUMIN SERPL-MCNC: 4.2 G/DL (ref 3.5–5.2)
ALP SERPL-CCNC: 72 U/L (ref 40–129)
ALT SERPL-CCNC: 42 U/L (ref 0–50)
ANION GAP SERPL CALCULATED.3IONS-SCNC: 9 MMOL/L (ref 7–16)
AST SERPL-CCNC: 36 U/L (ref 0–50)
BASOPHILS # BLD: 0.01 K/UL (ref 0–0.2)
BASOPHILS NFR BLD: 0 % (ref 0–2)
BILIRUB DIRECT SERPL-MCNC: <0.1 MG/DL (ref 0–0.2)
BILIRUB INDIRECT SERPL-MCNC: NORMAL MG/DL (ref 0–1)
BILIRUB SERPL-MCNC: 0.2 MG/DL (ref 0–1.2)
BUN SERPL-MCNC: 26 MG/DL (ref 8–23)
CALCIUM SERPL-MCNC: 9.5 MG/DL (ref 8.8–10.2)
CHLORIDE SERPL-SCNC: 98 MMOL/L (ref 98–107)
CO2 SERPL-SCNC: 38 MMOL/L (ref 22–29)
CREAT SERPL-MCNC: 0.5 MG/DL (ref 0.7–1.2)
D-DIMER QUANTITATIVE: 364 NG/ML DDU (ref 0–230)
EOSINOPHIL # BLD: 0.02 K/UL (ref 0.05–0.5)
EOSINOPHILS RELATIVE PERCENT: 0 % (ref 0–6)
ERYTHROCYTE [DISTWIDTH] IN BLOOD BY AUTOMATED COUNT: 14.6 % (ref 11.5–15)
GFR, ESTIMATED: >90 ML/MIN/1.73M2
GLUCOSE SERPL-MCNC: 94 MG/DL (ref 74–99)
HCT VFR BLD AUTO: 29.5 % (ref 37–54)
HGB BLD-MCNC: 9.1 G/DL (ref 12.5–16.5)
IMM GRANULOCYTES # BLD AUTO: 0.03 K/UL (ref 0–0.58)
IMM GRANULOCYTES NFR BLD: 0 % (ref 0–5)
LYMPHOCYTES NFR BLD: 0.9 K/UL (ref 1.5–4)
LYMPHOCYTES RELATIVE PERCENT: 13 % (ref 20–42)
MAGNESIUM SERPL-MCNC: 2.3 MG/DL (ref 1.6–2.4)
MCH RBC QN AUTO: 32.9 PG (ref 26–35)
MCHC RBC AUTO-ENTMCNC: 30.8 G/DL (ref 32–34.5)
MCV RBC AUTO: 106.5 FL (ref 80–99.9)
MONOCYTES NFR BLD: 0.63 K/UL (ref 0.1–0.95)
MONOCYTES NFR BLD: 9 % (ref 2–12)
NEUTROPHILS NFR BLD: 78 % (ref 43–80)
NEUTS SEG NFR BLD: 5.57 K/UL (ref 1.8–7.3)
PLATELET # BLD AUTO: 295 K/UL (ref 130–450)
PMV BLD AUTO: 9.7 FL (ref 7–12)
POTASSIUM SERPL-SCNC: 4.5 MMOL/L (ref 3.5–5.1)
PROT SERPL-MCNC: 7.2 G/DL (ref 6.4–8.3)
RBC # BLD AUTO: 2.77 M/UL (ref 3.8–5.8)
SODIUM SERPL-SCNC: 144 MMOL/L (ref 136–145)
TROPONIN I SERPL HS-MCNC: 27 NG/L (ref 0–22)
TROPONIN I SERPL HS-MCNC: 27 NG/L (ref 0–22)
TSH SERPL DL<=0.05 MIU/L-ACNC: 1.23 UIU/ML (ref 0.27–4.2)
WBC OTHER # BLD: 7.2 K/UL (ref 4.5–11.5)

## 2025-07-25 PROCEDURE — 6360000004 HC RX CONTRAST MEDICATION: Performed by: RADIOLOGY

## 2025-07-25 PROCEDURE — 80053 COMPREHEN METABOLIC PANEL: CPT

## 2025-07-25 PROCEDURE — 85025 COMPLETE CBC W/AUTO DIFF WBC: CPT

## 2025-07-25 PROCEDURE — 85379 FIBRIN DEGRADATION QUANT: CPT

## 2025-07-25 PROCEDURE — 99223 1ST HOSP IP/OBS HIGH 75: CPT | Performed by: STUDENT IN AN ORGANIZED HEALTH CARE EDUCATION/TRAINING PROGRAM

## 2025-07-25 PROCEDURE — 6370000000 HC RX 637 (ALT 250 FOR IP): Performed by: STUDENT IN AN ORGANIZED HEALTH CARE EDUCATION/TRAINING PROGRAM

## 2025-07-25 PROCEDURE — 71046 X-RAY EXAM CHEST 2 VIEWS: CPT

## 2025-07-25 PROCEDURE — 99285 EMERGENCY DEPT VISIT HI MDM: CPT

## 2025-07-25 PROCEDURE — 83735 ASSAY OF MAGNESIUM: CPT

## 2025-07-25 PROCEDURE — 2500000003 HC RX 250 WO HCPCS: Performed by: STUDENT IN AN ORGANIZED HEALTH CARE EDUCATION/TRAINING PROGRAM

## 2025-07-25 PROCEDURE — 1200000000 HC SEMI PRIVATE

## 2025-07-25 PROCEDURE — 96374 THER/PROPH/DIAG INJ IV PUSH: CPT

## 2025-07-25 PROCEDURE — 2580000003 HC RX 258: Performed by: STUDENT IN AN ORGANIZED HEALTH CARE EDUCATION/TRAINING PROGRAM

## 2025-07-25 PROCEDURE — 84443 ASSAY THYROID STIM HORMONE: CPT

## 2025-07-25 PROCEDURE — 71275 CT ANGIOGRAPHY CHEST: CPT

## 2025-07-25 PROCEDURE — 6370000000 HC RX 637 (ALT 250 FOR IP): Performed by: EMERGENCY MEDICINE

## 2025-07-25 PROCEDURE — 94640 AIRWAY INHALATION TREATMENT: CPT

## 2025-07-25 PROCEDURE — 6360000002 HC RX W HCPCS: Performed by: EMERGENCY MEDICINE

## 2025-07-25 PROCEDURE — G0378 HOSPITAL OBSERVATION PER HR: HCPCS

## 2025-07-25 PROCEDURE — 82248 BILIRUBIN DIRECT: CPT

## 2025-07-25 PROCEDURE — 84484 ASSAY OF TROPONIN QUANT: CPT

## 2025-07-25 PROCEDURE — 93005 ELECTROCARDIOGRAM TRACING: CPT | Performed by: PHYSICIAN ASSISTANT

## 2025-07-25 RX ORDER — ENOXAPARIN SODIUM 100 MG/ML
40 INJECTION SUBCUTANEOUS DAILY
Status: DISCONTINUED | OUTPATIENT
Start: 2025-07-26 | End: 2025-07-30 | Stop reason: HOSPADM

## 2025-07-25 RX ORDER — ROSUVASTATIN CALCIUM 20 MG/1
20 TABLET, COATED ORAL NIGHTLY
Status: DISCONTINUED | OUTPATIENT
Start: 2025-07-25 | End: 2025-07-30 | Stop reason: HOSPADM

## 2025-07-25 RX ORDER — FERROUS SULFATE 325(65) MG
325 TABLET ORAL EVERY MORNING
Status: DISCONTINUED | OUTPATIENT
Start: 2025-07-26 | End: 2025-07-30 | Stop reason: HOSPADM

## 2025-07-25 RX ORDER — MAGNESIUM SULFATE IN WATER 40 MG/ML
2000 INJECTION, SOLUTION INTRAVENOUS PRN
Status: DISCONTINUED | OUTPATIENT
Start: 2025-07-25 | End: 2025-07-30 | Stop reason: HOSPADM

## 2025-07-25 RX ORDER — IPRATROPIUM BROMIDE AND ALBUTEROL SULFATE 2.5; .5 MG/3ML; MG/3ML
1 SOLUTION RESPIRATORY (INHALATION) EVERY 4 HOURS PRN
Status: COMPLETED | OUTPATIENT
Start: 2025-07-25 | End: 2025-07-26

## 2025-07-25 RX ORDER — POLYETHYLENE GLYCOL 3350 17 G/17G
17 POWDER, FOR SOLUTION ORAL DAILY PRN
Status: DISCONTINUED | OUTPATIENT
Start: 2025-07-25 | End: 2025-07-30 | Stop reason: HOSPADM

## 2025-07-25 RX ORDER — LANOLIN ALCOHOL/MO/W.PET/CERES
100 CREAM (GRAM) TOPICAL EVERY MORNING
Status: DISCONTINUED | OUTPATIENT
Start: 2025-07-26 | End: 2025-07-30 | Stop reason: HOSPADM

## 2025-07-25 RX ORDER — ACETAMINOPHEN 325 MG/1
650 TABLET ORAL EVERY 6 HOURS PRN
Status: DISCONTINUED | OUTPATIENT
Start: 2025-07-25 | End: 2025-07-27 | Stop reason: SDUPTHER

## 2025-07-25 RX ORDER — IPRATROPIUM BROMIDE AND ALBUTEROL SULFATE 2.5; .5 MG/3ML; MG/3ML
1 SOLUTION RESPIRATORY (INHALATION)
Status: COMPLETED | OUTPATIENT
Start: 2025-07-25 | End: 2025-07-25

## 2025-07-25 RX ORDER — SODIUM CHLORIDE 9 MG/ML
INJECTION, SOLUTION INTRAVENOUS PRN
Status: DISCONTINUED | OUTPATIENT
Start: 2025-07-25 | End: 2025-07-30 | Stop reason: HOSPADM

## 2025-07-25 RX ORDER — METHYLPREDNISOLONE SODIUM SUCCINATE 125 MG/2ML
125 INJECTION INTRAMUSCULAR; INTRAVENOUS ONCE
Status: COMPLETED | OUTPATIENT
Start: 2025-07-25 | End: 2025-07-25

## 2025-07-25 RX ORDER — IOPAMIDOL 755 MG/ML
75 INJECTION, SOLUTION INTRAVASCULAR
Status: COMPLETED | OUTPATIENT
Start: 2025-07-25 | End: 2025-07-25

## 2025-07-25 RX ORDER — PANTOPRAZOLE SODIUM 40 MG/1
40 TABLET, DELAYED RELEASE ORAL
Status: DISCONTINUED | OUTPATIENT
Start: 2025-07-26 | End: 2025-07-30 | Stop reason: HOSPADM

## 2025-07-25 RX ORDER — SODIUM CHLORIDE 0.9 % (FLUSH) 0.9 %
5-40 SYRINGE (ML) INJECTION PRN
Status: DISCONTINUED | OUTPATIENT
Start: 2025-07-25 | End: 2025-07-30 | Stop reason: HOSPADM

## 2025-07-25 RX ORDER — POTASSIUM CHLORIDE 1500 MG/1
40 TABLET, EXTENDED RELEASE ORAL PRN
Status: DISCONTINUED | OUTPATIENT
Start: 2025-07-25 | End: 2025-07-30 | Stop reason: HOSPADM

## 2025-07-25 RX ORDER — ACETAMINOPHEN 650 MG/1
650 SUPPOSITORY RECTAL EVERY 6 HOURS PRN
Status: DISCONTINUED | OUTPATIENT
Start: 2025-07-25 | End: 2025-07-27 | Stop reason: SDUPTHER

## 2025-07-25 RX ORDER — SODIUM CHLORIDE 9 MG/ML
INJECTION, SOLUTION INTRAVENOUS CONTINUOUS
Status: ACTIVE | OUTPATIENT
Start: 2025-07-25 | End: 2025-07-26

## 2025-07-25 RX ORDER — POTASSIUM CHLORIDE 7.45 MG/ML
10 INJECTION INTRAVENOUS PRN
Status: DISCONTINUED | OUTPATIENT
Start: 2025-07-25 | End: 2025-07-30 | Stop reason: HOSPADM

## 2025-07-25 RX ORDER — ONDANSETRON 2 MG/ML
4 INJECTION INTRAMUSCULAR; INTRAVENOUS EVERY 6 HOURS PRN
Status: DISCONTINUED | OUTPATIENT
Start: 2025-07-25 | End: 2025-07-30 | Stop reason: HOSPADM

## 2025-07-25 RX ORDER — SODIUM CHLORIDE 0.9 % (FLUSH) 0.9 %
5-40 SYRINGE (ML) INJECTION EVERY 12 HOURS SCHEDULED
Status: DISCONTINUED | OUTPATIENT
Start: 2025-07-25 | End: 2025-07-30 | Stop reason: HOSPADM

## 2025-07-25 RX ORDER — ALBUTEROL SULFATE 0.83 MG/ML
2.5 SOLUTION RESPIRATORY (INHALATION) 4 TIMES DAILY PRN
Status: DISCONTINUED | OUTPATIENT
Start: 2025-07-25 | End: 2025-07-30 | Stop reason: HOSPADM

## 2025-07-25 RX ORDER — ONDANSETRON 4 MG/1
4 TABLET, ORALLY DISINTEGRATING ORAL EVERY 8 HOURS PRN
Status: DISCONTINUED | OUTPATIENT
Start: 2025-07-25 | End: 2025-07-30 | Stop reason: HOSPADM

## 2025-07-25 RX ORDER — METOPROLOL SUCCINATE 25 MG/1
25 TABLET, EXTENDED RELEASE ORAL EVERY MORNING
Status: DISCONTINUED | OUTPATIENT
Start: 2025-07-26 | End: 2025-07-30 | Stop reason: HOSPADM

## 2025-07-25 RX ORDER — ALBUTEROL SULFATE 90 UG/1
2 INHALANT RESPIRATORY (INHALATION) 4 TIMES DAILY PRN
Status: DISCONTINUED | OUTPATIENT
Start: 2025-07-25 | End: 2025-07-25 | Stop reason: CLARIF

## 2025-07-25 RX ADMIN — IPRATROPIUM BROMIDE AND ALBUTEROL SULFATE 1 DOSE: .5; 3 SOLUTION RESPIRATORY (INHALATION) at 20:40

## 2025-07-25 RX ADMIN — ROSUVASTATIN CALCIUM 20 MG: 20 TABLET, FILM COATED ORAL at 23:23

## 2025-07-25 RX ADMIN — IPRATROPIUM BROMIDE AND ALBUTEROL SULFATE 1 DOSE: .5; 3 SOLUTION RESPIRATORY (INHALATION) at 20:41

## 2025-07-25 RX ADMIN — IPRATROPIUM BROMIDE AND ALBUTEROL SULFATE 1 DOSE: .5; 3 SOLUTION RESPIRATORY (INHALATION) at 20:39

## 2025-07-25 RX ADMIN — SODIUM CHLORIDE: 0.9 INJECTION, SOLUTION INTRAVENOUS at 23:24

## 2025-07-25 RX ADMIN — IOPAMIDOL 75 ML: 755 INJECTION, SOLUTION INTRAVENOUS at 19:52

## 2025-07-25 RX ADMIN — SODIUM CHLORIDE, PRESERVATIVE FREE 10 ML: 5 INJECTION INTRAVENOUS at 23:25

## 2025-07-25 RX ADMIN — METHYLPREDNISOLONE SODIUM SUCCINATE 125 MG: 125 INJECTION INTRAMUSCULAR; INTRAVENOUS at 18:52

## 2025-07-25 NOTE — ED TRIAGE NOTES
59 y/o male to the ed with a c/c of tachycardia as reported by patient's ECF. Patient reports that his vitals were checked after smoking a cigarette and receiving a breathing treatment. Patient additionally reports that ECF received a new BiPAP for his use but was unfamilier wuth its operation. Stated that they were sending him to the ED for evaluation. Upon arrival, patient ambulatory without assistance, noted PW&D and presenting in NAD. Patient denies chest pain, sob or difficulty breathing. Patient denies ABD pain, n/v/d or fever. Patient noted with + pms x 4, pupils PERRLA @ 3 and lung sounds that are clear and equil bilaterally. Patient placed on telemetry, BP and pulse ox. 12-lead EKG performed. Vitals noted as recorded. PIV placed with labs drawn and sent. Call light placed within patient's reach, and bed in lowest position with side rails up x 2 for safety. Provider at the bedside for assessment.

## 2025-07-25 NOTE — ED NOTES
Name: Anthony Tam  : 1965  MRN: [id number]    Date: 2025    Benefits of immediately proceeding with Radiology exam outweigh the risks and therefore the following is being waived:      [] Pregnancy test    [] Protocol for Iodine allergy    [] MRI questionnaire    [x] BUN/Creatinine        DO Sandoval Serrano Joseph A, DO  [id number]

## 2025-07-25 NOTE — ED PROVIDER NOTES
[facility] 6[facility] MED SURG  EMERGENCY DEPARTMENT ENCOUNTER        Pt Name: Anthony Tam  MRN: [id number]  Birthdate 1965  Date of evaluation: 7/25/2025  Provider: Todd Nick DO  PCP: No primary care provider on file.  Note Started: 6:39 PM EDT 7/25/25    CHIEF COMPLAINT       Chief Complaint   Patient presents with    Tachycardia     Patient sent in for elevated heart rate after smoking cigarette       HISTORY OF PRESENT ILLNESS: 1 or more Elements     History from : Patient    Limitations to history : None    Anthony Tam is a 60 y.o. male who presents from [facility].  Patient is a patient at the [facility] [place] with history of respiratory failure secondary to COPD he is on chronic O2.  He states he was participating in physical therapy where they had him doing a walking exercise any became very short of breath he denied any chest pain.  He stated his heart rate was elevated therefore he sent him for evaluation he still is actively smoking and has been smoking there and is on oxygen.  He denies any cough or hemoptysis    Nursing Notes were all reviewed and agreed with or any disagreements were addressed in the HPI.    REVIEW OF SYSTEMS :      Review of Systems   Respiratory:  Positive for cough, shortness of breath and wheezing.        Positives and Pertinent negatives as per HPI.     SURGICAL HISTORY   History reviewed. No pertinent surgical history.    CURRENTMEDICATIONS       Current Discharge Medication List        CONTINUE these medications which have NOT CHANGED    Details   pantoprazole (PROTONIX) 40 MG tablet Take 1 tablet by mouth every morning (before breakfast)  Qty: 30 tablet, Refills: 3      metoprolol succinate (TOPROL XL) 50 MG extended release tablet Take 0.5 tablets by mouth every morning      albuterol sulfate HFA (VENTOLIN HFA) 108 (90 Base) MCG/ACT inhaler Inhale 2 puffs into the lungs 4 times daily as needed for Wheezing or Shortness of Breath

## 2025-07-26 PROBLEM — D64.9 CHRONIC ANEMIA: Status: ACTIVE | Noted: 2025-07-26

## 2025-07-26 PROBLEM — E78.2 MIXED HYPERLIPIDEMIA: Status: ACTIVE | Noted: 2025-07-26

## 2025-07-26 PROBLEM — I10 PRIMARY HYPERTENSION: Status: ACTIVE | Noted: 2025-07-26

## 2025-07-26 PROBLEM — J44.9 COPD, SEVERE (HCC): Status: ACTIVE | Noted: 2025-07-26

## 2025-07-26 PROCEDURE — 2500000003 HC RX 250 WO HCPCS: Performed by: STUDENT IN AN ORGANIZED HEALTH CARE EDUCATION/TRAINING PROGRAM

## 2025-07-26 PROCEDURE — 6360000002 HC RX W HCPCS: Performed by: STUDENT IN AN ORGANIZED HEALTH CARE EDUCATION/TRAINING PROGRAM

## 2025-07-26 PROCEDURE — G0378 HOSPITAL OBSERVATION PER HR: HCPCS

## 2025-07-26 PROCEDURE — 6370000000 HC RX 637 (ALT 250 FOR IP): Performed by: STUDENT IN AN ORGANIZED HEALTH CARE EDUCATION/TRAINING PROGRAM

## 2025-07-26 PROCEDURE — 2580000003 HC RX 258: Performed by: STUDENT IN AN ORGANIZED HEALTH CARE EDUCATION/TRAINING PROGRAM

## 2025-07-26 PROCEDURE — 1200000000 HC SEMI PRIVATE

## 2025-07-26 PROCEDURE — 99233 SBSQ HOSP IP/OBS HIGH 50: CPT | Performed by: STUDENT IN AN ORGANIZED HEALTH CARE EDUCATION/TRAINING PROGRAM

## 2025-07-26 PROCEDURE — 94640 AIRWAY INHALATION TREATMENT: CPT

## 2025-07-26 PROCEDURE — 2700000000 HC OXYGEN THERAPY PER DAY

## 2025-07-26 PROCEDURE — 6370000000 HC RX 637 (ALT 250 FOR IP)

## 2025-07-26 RX ORDER — NICOTINE 21 MG/24HR
1 PATCH, TRANSDERMAL 24 HOURS TRANSDERMAL DAILY
Status: DISCONTINUED | OUTPATIENT
Start: 2025-07-26 | End: 2025-07-30 | Stop reason: HOSPADM

## 2025-07-26 RX ORDER — ARFORMOTEROL TARTRATE 15 UG/2ML
15 SOLUTION RESPIRATORY (INHALATION)
Status: DISCONTINUED | OUTPATIENT
Start: 2025-07-26 | End: 2025-07-30 | Stop reason: HOSPADM

## 2025-07-26 RX ORDER — IBUPROFEN 400 MG/1
400 TABLET, FILM COATED ORAL ONCE
Status: COMPLETED | OUTPATIENT
Start: 2025-07-27 | End: 2025-07-26

## 2025-07-26 RX ORDER — BUDESONIDE 0.5 MG/2ML
0.5 INHALANT ORAL
Status: DISCONTINUED | OUTPATIENT
Start: 2025-07-26 | End: 2025-07-30 | Stop reason: HOSPADM

## 2025-07-26 RX ORDER — PREDNISONE 20 MG/1
20 TABLET ORAL DAILY
Status: DISCONTINUED | OUTPATIENT
Start: 2025-07-26 | End: 2025-07-30 | Stop reason: HOSPADM

## 2025-07-26 RX ADMIN — ACETAMINOPHEN 650 MG: 325 TABLET ORAL at 08:07

## 2025-07-26 RX ADMIN — FERROUS SULFATE TAB 325 MG (65 MG ELEMENTAL FE) 325 MG: 325 (65 FE) TAB at 08:08

## 2025-07-26 RX ADMIN — METOPROLOL SUCCINATE 25 MG: 25 TABLET, EXTENDED RELEASE ORAL at 08:08

## 2025-07-26 RX ADMIN — PREDNISONE 20 MG: 20 TABLET ORAL at 13:22

## 2025-07-26 RX ADMIN — IPRATROPIUM BROMIDE AND ALBUTEROL SULFATE 1 DOSE: 2.5; .5 SOLUTION RESPIRATORY (INHALATION) at 18:09

## 2025-07-26 RX ADMIN — ENOXAPARIN SODIUM 40 MG: 100 INJECTION SUBCUTANEOUS at 08:08

## 2025-07-26 RX ADMIN — BUDESONIDE 500 MCG: 0.5 SUSPENSION RESPIRATORY (INHALATION) at 18:09

## 2025-07-26 RX ADMIN — IBUPROFEN 400 MG: 400 TABLET, FILM COATED ORAL at 23:43

## 2025-07-26 RX ADMIN — ACETAMINOPHEN 650 MG: 325 TABLET ORAL at 00:07

## 2025-07-26 RX ADMIN — ALBUTEROL SULFATE 2.5 MG: 2.5 SOLUTION RESPIRATORY (INHALATION) at 22:20

## 2025-07-26 RX ADMIN — Medication 100 MG: at 08:08

## 2025-07-26 RX ADMIN — PANTOPRAZOLE SODIUM 40 MG: 40 TABLET, DELAYED RELEASE ORAL at 08:08

## 2025-07-26 RX ADMIN — ROSUVASTATIN CALCIUM 20 MG: 20 TABLET, FILM COATED ORAL at 20:11

## 2025-07-26 RX ADMIN — IPRATROPIUM BROMIDE AND ALBUTEROL SULFATE 1 DOSE: 2.5; .5 SOLUTION RESPIRATORY (INHALATION) at 12:37

## 2025-07-26 RX ADMIN — IPRATROPIUM BROMIDE AND ALBUTEROL SULFATE 1 DOSE: 2.5; .5 SOLUTION RESPIRATORY (INHALATION) at 00:22

## 2025-07-26 RX ADMIN — SODIUM CHLORIDE: 0.9 INJECTION, SOLUTION INTRAVENOUS at 12:54

## 2025-07-26 RX ADMIN — ARFORMOTEROL TARTRATE 15 MCG: 15 SOLUTION RESPIRATORY (INHALATION) at 18:09

## 2025-07-26 RX ADMIN — ACETAMINOPHEN 650 MG: 325 TABLET ORAL at 22:23

## 2025-07-26 RX ADMIN — SODIUM CHLORIDE, PRESERVATIVE FREE 10 ML: 5 INJECTION INTRAVENOUS at 08:08

## 2025-07-26 ASSESSMENT — PAIN SCALES - GENERAL
PAINLEVEL_OUTOF10: 9
PAINLEVEL_OUTOF10: 4
PAINLEVEL_OUTOF10: 10
PAINLEVEL_OUTOF10: 5
PAINLEVEL_OUTOF10: 1
PAINLEVEL_OUTOF10: 4
PAINLEVEL_OUTOF10: 9

## 2025-07-26 ASSESSMENT — PAIN DESCRIPTION - PAIN TYPE
TYPE: ACUTE PAIN
TYPE: ACUTE PAIN

## 2025-07-26 ASSESSMENT — PAIN DESCRIPTION - ORIENTATION
ORIENTATION: RIGHT
ORIENTATION: RIGHT

## 2025-07-26 ASSESSMENT — PAIN DESCRIPTION - DESCRIPTORS
DESCRIPTORS: ACHING
DESCRIPTORS: ACHING;DISCOMFORT;THROBBING
DESCRIPTORS: ACHING
DESCRIPTORS: ACHING;DISCOMFORT;THROBBING

## 2025-07-26 ASSESSMENT — PAIN - FUNCTIONAL ASSESSMENT
PAIN_FUNCTIONAL_ASSESSMENT: PREVENTS OR INTERFERES WITH MANY ACTIVE NOT PASSIVE ACTIVITIES
PAIN_FUNCTIONAL_ASSESSMENT: ACTIVITIES ARE NOT PREVENTED
PAIN_FUNCTIONAL_ASSESSMENT: ACTIVITIES ARE NOT PREVENTED

## 2025-07-26 ASSESSMENT — PAIN DESCRIPTION - LOCATION
LOCATION: GENERALIZED
LOCATION: HEAD
LOCATION: HEAD
LOCATION: FOOT
LOCATION: FOOT

## 2025-07-26 ASSESSMENT — PAIN SCALES - WONG BAKER: WONGBAKER_NUMERICALRESPONSE: NO HURT

## 2025-07-26 NOTE — ED NOTES
Called [facility] to give report for patient return. Facility told me they cannot accept patient back to facility due to need for \"vent machine.\" They do not have the ability to do so at their facility per supervisor.

## 2025-07-26 NOTE — DISCHARGE INSTRUCTIONS
Call family doctor and specialist to schedule an appointment    Have your doctor obtain copies of all results and records and re-review them with you    Please take your papers with you to all followup appointments    If symptoms reoccur or worsen or if you believe you need emergency services for any other reason return to [facility]      smoking cessation and benefits thereof:    Lowered risk for lung cancer and many other types of cancer.  Reduced risk for heart disease, stroke, and peripheral vascular disease (narrowing of the blood vessels outside your heart).  Reduced heart disease risk within 1 to 2 years of quitting.  Reduced respiratory symptoms, such as coughing, wheezing, and shortness of breath. While these symptoms may not disappear, they do not continue to progress at the same rate among people who quit compared with those who continue to smoke.  Reduced risk of developing some lung diseases (such as chronic obstructive pulmonary disease, also known as COPD, one of the leading causes of death in the [place]).  Reduced risk for infertility in women of childbearing age. Women who stop smoking during pregnancy also reduce their risk of having a low birth weight baby.    benefits on counseling and Nicotine replacement products which include  Over-the-counter (nicotine patch [which is also available by prescription], gum, lozenge) and Prescription (nicotine patch, inhaler, nasal spray) and  Prescription non-nicotine medications: bupropion SR (Zyban®),6 varenicline tartrate (Chantix®)    Quitline Services  Call 5-002-QUIT-NOWexternal icon (1-229.673.2739) if you want help quitting. This is a free telephone support service that can help people who want to stop smoking or using tobacco. Callers are routed to their state quitlines, which offer several types of quit information and services. These may include:    Free support, advice, and counseling from experienced quitline coaches  A

## 2025-07-26 NOTE — PROGRESS NOTES
Pt found smoking cigarettes in the bathroom. Confiscated 2 lighters and empty box of cigarettes. He claimed he just had the one half a cigarette and he wanted to get rid of the temptation. He told last nights nurse the same thing. Pt was educated on why smoking is harming him and further deteriorating his health. He was further told that smoking is not allowed anywhere [facility]. He was informed we would have to contact security if this happens again. He stated he understood and then agreed to a nicotine patch.

## 2025-07-26 NOTE — ED NOTES
ED to Inpatient Handoff Report    Notified Gladys  that electronic handoff available and patient ready for transport to room 623      Safety Risks: None identified    Patient in Restraints: no    Constant Observer or Patient : no    Telemetry Monitoring Ordered : yes           Order to transfer to unit without monitor:N/A        Deterioration Index Score:   Predictive Model Details          36 (Caution)  Factor Value    Calculated 7/25/2025 22:14 31% Supplemental oxygen Nasal cannula    Deterioration Index Model 28% Age 60 years old     10% Sodium 144 mmol/L     7% Potassium 4.5 mmol/L     6% Respiratory rate 18     6% Systolic 102     4% Pulse 96     4% Pulse oximetry 100 %     3% Hematocrit abnormal (29.5 %)     3% BUN abnormal (26 mg/dL)     0% Temperature 98.1 °F (36.7 °C)     0% WBC count 7.2 k/uL        Vitals:    07/25/25 1742 07/25/25 2213   BP: 138/88 102/65   Pulse: (!) 104 96   Resp: 16 18   Temp: 98.1 °F (36.7 °C)    SpO2: 95% 100%   Weight: 56.7 kg (125 lb)    Height: 1.753 m (5' 9\")          Opportunity for questions and clarification was provided.

## 2025-07-26 NOTE — H&P
[facility] Hospitalist Group History and Physical      CHIEF COMPLAINT: Tachycardia    History of Present Illness: 68-year-old gentleman with past medical history significant for hyperlipidemia, hypertension, severe COPD presented to ED with tachycardia.  The patient is from [facility] and is on chronic oxygen secondary to severe COPD.  He was participating in physical therapy when he started feeling short of breath without chest pain and felt his heart racing, was sent to ED for evaluation.  Workup in ED was essentially benign.  Patient was given breathing treatments which helped relieve his symptoms.  To keep patient for observation until sent back to the facility.    Informant(s) for H&P:    REVIEW OF SYSTEMS:  A comprehensive review of systems was negative except for: what is in the HPI      PMH:  History reviewed. No pertinent past medical history.    Surgical History:  History reviewed. No pertinent surgical history.    Medications Prior to Admission:    Prior to Admission medications    Medication Sig Start Date End Date Taking? Authorizing Provider   pantoprazole (PROTONIX) 40 MG tablet Take 1 tablet by mouth every morning (before breakfast) 7/25/25   Clarisa Tobias MD   predniSONE (DELTASONE) 10 MG tablet Take 2 tablets by mouth daily for 3 days, THEN 1 tablet daily for 3 days. 7/25/25 8/3/25  Clarisa Tobias MD   metoprolol succinate (TOPROL XL) 50 MG extended release tablet Take 0.5 tablets by mouth every morning    ProviderMihai MD   albuterol sulfate HFA (VENTOLIN HFA) 108 (90 Base) MCG/ACT inhaler Inhale 2 puffs into the lungs 4 times daily as needed for Wheezing or Shortness of Breath    ProviderMihai MD   albuterol (PROVENTIL) (2.5 MG/3ML) 0.083% nebulizer solution Take 3 mLs by nebulization 4 times daily as needed for Wheezing or Shortness of Breath    Mihai Toscano MD   Nutritional Supplements (ENSURE PLUS) LIQD Take 1

## 2025-07-26 NOTE — ED NOTES
Spoke with Claribel, unit manager at [facility]( [phone])   States pt was d/c from here with DME order for BIPAP 12/6 AVAPS. They sent order the order to their medical equipment provider who informed them that the settings ordered required patient to be in a facility with RT.  The facility then called Dr Rene office to see if setting could be changed. Per Claribel, Dr Rene office stated they would not be able to change the settings. Pt was then sent to ER as [facility] states they were unable to give patient the appropriate care and equipment.

## 2025-07-26 NOTE — ED NOTES
This patient was pending dispo back to a nursing facility.  I never initially evaluated the patient.  However apparently the facility called his pulmonologist office and the patient is unable to be on AVAPS at the facility that he is at they do not have the machine necessary that he needs for that.  I went evaluate the patient.  He was laying in bed in no acute distress at this time.  Patient will need to be admitted for social work to evaluate him and see about getting an AVAPS at the facility or potentially sending him to a different facility if necessary.  Our nurse did confirm this twice with the patient's facility he was staying at.  Patient agreeable to plan.  Discussed case with Dr. Price who accepted for admission.  Patient admitted in stable condition for further care.     Noemí Nuno,   07/25/25 4735

## 2025-07-26 NOTE — PROGRESS NOTES
[facility] Hospitalist Progress Note    Admitting Date and Time: 7/25/2025  5:02 PM  Admit Dx: Palpitations [R00.2]  Shortness of breath [R06.02]  Sinus tachycardia [R00.0]  Tobacco abuse [Z72.0]  COPD with acute exacerbation (HCC) [J44.1]  Pulmonary emphysema, unspecified emphysema type (HCC) [J43.9]    Subjective:  Patient is being followed for Palpitations [R00.2]  Shortness of breath [R06.02]  Sinus tachycardia [R00.0]  Tobacco abuse [Z72.0]  COPD with acute exacerbation (HCC) [J44.1]  Pulmonary emphysema, unspecified emphysema type (HCC) [J43.9]     Patient seen and evaluated. He has shortness of breath but notes it is normal for him, not worsening. No other concerns or complaints at this time.     ROS: Pertinent findings stated above. Denies dizziness, diaphoresis, fever, chills, chest pain, palpitations, abdominal pain, nausea, vomiting, dysuria, hematuria, melena, hematochezia, diarrhea, or constipation.      sodium chloride flush  5-40 mL IntraVENous 2 times per day    enoxaparin  40 mg SubCUTAneous Daily    metoprolol succinate  25 mg Oral QAM    pantoprazole  40 mg Oral QAM AC    ferrous sulfate  325 mg Oral QAM    rosuvastatin  20 mg Oral Nightly    thiamine  100 mg Oral QAM     ipratropium 0.5 mg-albuterol 2.5 mg, 1 Dose, Q4H PRN  sodium chloride flush, 5-40 mL, PRN  sodium chloride, , PRN  potassium chloride, 40 mEq, PRN   Or  potassium alternative oral replacement, 40 mEq, PRN   Or  potassium chloride, 10 mEq, PRN  magnesium sulfate, 2,000 mg, PRN  ondansetron, 4 mg, Q8H PRN   Or  ondansetron, 4 mg, Q6H PRN  polyethylene glycol, 17 g, Daily PRN  acetaminophen, 650 mg, Q6H PRN   Or  acetaminophen, 650 mg, Q6H PRN  melatonin, 9 mg, Nightly PRN  albuterol, 2.5 mg, 4x Daily PRN         Objective:    /73   Pulse 100   Temp 98.4 °F (36.9 °C) (Oral)   Resp 16   Ht 1.753 m (5' 9\")   Wt 56.7 kg (125 lb)   SpO2 95%   BMI 18.46 kg/m²     General Appearance: frail, thin appearing. No acute

## 2025-07-26 NOTE — PROGRESS NOTES
4 Eyes Skin Assessment     NAME:  Anthony Tam  YOB: 1965  MEDICAL RECORD NUMBER:  [id number]    The patient is being assessed for  Admission    I agree that at least one RN has performed a thorough Head to Toe Skin Assessment on the patient. ALL assessment sites listed below have been assessed.      Areas assessed by both nurses:    Head, Face, Ears, Shoulders, Back, Chest, Arms, Elbows, Hands, Sacrum. Buttock, Coccyx, Ischium, Legs. Feet and Heels, and Under Medical Devices         Does the Patient have a Wound? No noted wound(s)       Dameon Prevention initiated by RN: Yes  Wound Care Orders initiated by RN: No    For hospital-acquired stage 1 & 2 and ALL Stage 3,4, Unstageable, DTI, NWPT, and Complex wounds: place order “IP Wound Care/Ostomy Nurse Eval and Treat” by RN under : No    New Ostomies, if present place, Ostomy referral order under : No     Nurse 1 eSignature: Electronically signed by Janneth Watters RN on 7/26/25 at 5:01 AM EDT    **SHARE this note so that the co-signing nurse can place an eSignature**    Nurse 2 eSignature: {Esignature:[id number]}

## 2025-07-27 PROBLEM — J44.9 COPD (CHRONIC OBSTRUCTIVE PULMONARY DISEASE) (HCC): Status: ACTIVE | Noted: 2025-07-27

## 2025-07-27 PROCEDURE — G0378 HOSPITAL OBSERVATION PER HR: HCPCS

## 2025-07-27 PROCEDURE — 6360000002 HC RX W HCPCS: Performed by: STUDENT IN AN ORGANIZED HEALTH CARE EDUCATION/TRAINING PROGRAM

## 2025-07-27 PROCEDURE — 6370000000 HC RX 637 (ALT 250 FOR IP): Performed by: STUDENT IN AN ORGANIZED HEALTH CARE EDUCATION/TRAINING PROGRAM

## 2025-07-27 PROCEDURE — 1200000000 HC SEMI PRIVATE

## 2025-07-27 PROCEDURE — 99232 SBSQ HOSP IP/OBS MODERATE 35: CPT | Performed by: STUDENT IN AN ORGANIZED HEALTH CARE EDUCATION/TRAINING PROGRAM

## 2025-07-27 PROCEDURE — 2500000003 HC RX 250 WO HCPCS: Performed by: STUDENT IN AN ORGANIZED HEALTH CARE EDUCATION/TRAINING PROGRAM

## 2025-07-27 PROCEDURE — 94640 AIRWAY INHALATION TREATMENT: CPT

## 2025-07-27 PROCEDURE — 2700000000 HC OXYGEN THERAPY PER DAY

## 2025-07-27 RX ORDER — ACETAMINOPHEN 650 MG/1
650 SUPPOSITORY RECTAL EVERY 6 HOURS PRN
Status: DISCONTINUED | OUTPATIENT
Start: 2025-07-27 | End: 2025-07-27

## 2025-07-27 RX ORDER — IBUPROFEN 400 MG/1
400 TABLET, FILM COATED ORAL EVERY 6 HOURS PRN
Status: DISCONTINUED | OUTPATIENT
Start: 2025-07-27 | End: 2025-07-30 | Stop reason: HOSPADM

## 2025-07-27 RX ORDER — ACETAMINOPHEN 325 MG/1
650 TABLET ORAL EVERY 6 HOURS PRN
Status: DISCONTINUED | OUTPATIENT
Start: 2025-07-27 | End: 2025-07-30 | Stop reason: HOSPADM

## 2025-07-27 RX ORDER — ACETAMINOPHEN 650 MG/1
650 SUPPOSITORY RECTAL EVERY 6 HOURS PRN
Status: DISCONTINUED | OUTPATIENT
Start: 2025-07-27 | End: 2025-07-30 | Stop reason: HOSPADM

## 2025-07-27 RX ORDER — ACETAMINOPHEN 325 MG/1
650 TABLET ORAL EVERY 6 HOURS PRN
Status: DISCONTINUED | OUTPATIENT
Start: 2025-07-27 | End: 2025-07-27

## 2025-07-27 RX ADMIN — SODIUM CHLORIDE, PRESERVATIVE FREE 10 ML: 5 INJECTION INTRAVENOUS at 20:07

## 2025-07-27 RX ADMIN — IBUPROFEN 400 MG: 400 TABLET, FILM COATED ORAL at 08:18

## 2025-07-27 RX ADMIN — METOPROLOL SUCCINATE 25 MG: 25 TABLET, EXTENDED RELEASE ORAL at 08:09

## 2025-07-27 RX ADMIN — PREDNISONE 20 MG: 20 TABLET ORAL at 08:09

## 2025-07-27 RX ADMIN — Medication 100 MG: at 08:09

## 2025-07-27 RX ADMIN — ARFORMOTEROL TARTRATE 15 MCG: 15 SOLUTION RESPIRATORY (INHALATION) at 08:25

## 2025-07-27 RX ADMIN — ACETAMINOPHEN 650 MG: 325 TABLET ORAL at 12:07

## 2025-07-27 RX ADMIN — BUDESONIDE 500 MCG: 0.5 SUSPENSION RESPIRATORY (INHALATION) at 08:25

## 2025-07-27 RX ADMIN — IBUPROFEN 400 MG: 400 TABLET, FILM COATED ORAL at 23:32

## 2025-07-27 RX ADMIN — ARFORMOTEROL TARTRATE 15 MCG: 15 SOLUTION RESPIRATORY (INHALATION) at 19:17

## 2025-07-27 RX ADMIN — ROSUVASTATIN CALCIUM 20 MG: 20 TABLET, FILM COATED ORAL at 20:07

## 2025-07-27 RX ADMIN — ALBUTEROL SULFATE 2.5 MG: 2.5 SOLUTION RESPIRATORY (INHALATION) at 12:39

## 2025-07-27 RX ADMIN — ALBUTEROL SULFATE 2.5 MG: 2.5 SOLUTION RESPIRATORY (INHALATION) at 19:17

## 2025-07-27 RX ADMIN — SODIUM CHLORIDE, PRESERVATIVE FREE 10 ML: 5 INJECTION INTRAVENOUS at 08:10

## 2025-07-27 RX ADMIN — ENOXAPARIN SODIUM 40 MG: 100 INJECTION SUBCUTANEOUS at 08:09

## 2025-07-27 RX ADMIN — ALBUTEROL SULFATE 2.5 MG: 2.5 SOLUTION RESPIRATORY (INHALATION) at 17:12

## 2025-07-27 RX ADMIN — IBUPROFEN 400 MG: 400 TABLET, FILM COATED ORAL at 15:10

## 2025-07-27 RX ADMIN — ACETAMINOPHEN 650 MG: 325 TABLET ORAL at 20:36

## 2025-07-27 RX ADMIN — ALBUTEROL SULFATE 2.5 MG: 2.5 SOLUTION RESPIRATORY (INHALATION) at 08:25

## 2025-07-27 RX ADMIN — FERROUS SULFATE TAB 325 MG (65 MG ELEMENTAL FE) 325 MG: 325 (65 FE) TAB at 08:09

## 2025-07-27 RX ADMIN — PANTOPRAZOLE SODIUM 40 MG: 40 TABLET, DELAYED RELEASE ORAL at 05:10

## 2025-07-27 RX ADMIN — BUDESONIDE 500 MCG: 0.5 SUSPENSION RESPIRATORY (INHALATION) at 19:17

## 2025-07-27 ASSESSMENT — PAIN SCALES - GENERAL
PAINLEVEL_OUTOF10: 8
PAINLEVEL_OUTOF10: 6
PAINLEVEL_OUTOF10: 5
PAINLEVEL_OUTOF10: 0
PAINLEVEL_OUTOF10: 9
PAINLEVEL_OUTOF10: 9

## 2025-07-27 ASSESSMENT — PAIN DESCRIPTION - DESCRIPTORS
DESCRIPTORS: ACHING;DISCOMFORT;THROBBING
DESCRIPTORS: ACHING;DISCOMFORT;SORE
DESCRIPTORS: ACHING;DISCOMFORT;SORE
DESCRIPTORS: ACHING;DISCOMFORT;THROBBING
DESCRIPTORS: ACHING;DISCOMFORT;SORE

## 2025-07-27 ASSESSMENT — PAIN DESCRIPTION - LOCATION
LOCATION: HEAD

## 2025-07-27 ASSESSMENT — PAIN - FUNCTIONAL ASSESSMENT: PAIN_FUNCTIONAL_ASSESSMENT: ACTIVITIES ARE NOT PREVENTED

## 2025-07-27 ASSESSMENT — PAIN DESCRIPTION - ORIENTATION
ORIENTATION: RIGHT;LEFT

## 2025-07-27 NOTE — PROGRESS NOTES
[facility] Hospitalist Progress Note    Admitting Date and Time: 7/25/2025  5:02 PM  Admit Dx: Palpitations [R00.2]  Shortness of breath [R06.02]  Sinus tachycardia [R00.0]  Tobacco abuse [Z72.0]  COPD with acute exacerbation (HCC) [J44.1]  Pulmonary emphysema, unspecified emphysema type (HCC) [J43.9]    Subjective:  Patient is being followed for Palpitations [R00.2]  Shortness of breath [R06.02]  Sinus tachycardia [R00.0]  Tobacco abuse [Z72.0]  COPD with acute exacerbation (HCC) [J44.1]  Pulmonary emphysema, unspecified emphysema type (HCC) [J43.9]     Patient resting comfortably in bed on NC. He voices no complaints at this time.     ROS: Pertinent findings stated above. Denies dizziness, diaphoresis, fever, chills, chest pain, palpitations, cough, shortness of breath, abdominal pain, nausea, vomiting, dysuria, hematuria, melena, hematochezia, diarrhea, or constipation.      arformoterol tartrate  15 mcg Nebulization BID RT    budesonide  0.5 mg Nebulization BID RT    predniSONE  20 mg Oral Daily    nicotine  1 patch TransDERmal Daily    sodium chloride flush  5-40 mL IntraVENous 2 times per day    enoxaparin  40 mg SubCUTAneous Daily    metoprolol succinate  25 mg Oral QAM    pantoprazole  40 mg Oral QAM AC    ferrous sulfate  325 mg Oral QAM    rosuvastatin  20 mg Oral Nightly    thiamine  100 mg Oral QAM     ibuprofen, 400 mg, Q6H PRN  acetaminophen, 650 mg, Q6H PRN   Or  acetaminophen, 650 mg, Q6H PRN  sodium chloride flush, 5-40 mL, PRN  sodium chloride, , PRN  potassium chloride, 40 mEq, PRN   Or  potassium alternative oral replacement, 40 mEq, PRN   Or  potassium chloride, 10 mEq, PRN  magnesium sulfate, 2,000 mg, PRN  ondansetron, 4 mg, Q8H PRN   Or  ondansetron, 4 mg, Q6H PRN  polyethylene glycol, 17 g, Daily PRN  melatonin, 9 mg, Nightly PRN  albuterol, 2.5 mg, 4x Daily PRN         Objective:    /65   Pulse 72   Temp 97.4 °F (36.3 °C) (Oral)   Resp 18   Ht 1.753 m (5' 9\")   Wt 56.7

## 2025-07-27 NOTE — PROGRESS NOTES
AVAPS order placed per Dr. Tobias.    Electronically signed by Estefania Raya RN on 7/27/2025 at 12:41 PM

## 2025-07-28 LAB
EKG ATRIAL RATE: 107 BPM
EKG P AXIS: 81 DEGREES
EKG P-R INTERVAL: 128 MS
EKG Q-T INTERVAL: 342 MS
EKG QRS DURATION: 86 MS
EKG QTC CALCULATION (BAZETT): 456 MS
EKG R AXIS: 84 DEGREES
EKG T AXIS: 84 DEGREES
EKG VENTRICULAR RATE: 107 BPM

## 2025-07-28 PROCEDURE — 94640 AIRWAY INHALATION TREATMENT: CPT

## 2025-07-28 PROCEDURE — 1200000000 HC SEMI PRIVATE

## 2025-07-28 PROCEDURE — 2500000003 HC RX 250 WO HCPCS: Performed by: STUDENT IN AN ORGANIZED HEALTH CARE EDUCATION/TRAINING PROGRAM

## 2025-07-28 PROCEDURE — 99232 SBSQ HOSP IP/OBS MODERATE 35: CPT | Performed by: INTERNAL MEDICINE

## 2025-07-28 PROCEDURE — 5A09357 ASSISTANCE WITH RESPIRATORY VENTILATION, LESS THAN 24 CONSECUTIVE HOURS, CONTINUOUS POSITIVE AIRWAY PRESSURE: ICD-10-PCS | Performed by: STUDENT IN AN ORGANIZED HEALTH CARE EDUCATION/TRAINING PROGRAM

## 2025-07-28 PROCEDURE — 93010 ELECTROCARDIOGRAM REPORT: CPT | Performed by: INTERNAL MEDICINE

## 2025-07-28 PROCEDURE — 6360000002 HC RX W HCPCS: Performed by: STUDENT IN AN ORGANIZED HEALTH CARE EDUCATION/TRAINING PROGRAM

## 2025-07-28 PROCEDURE — 6370000000 HC RX 637 (ALT 250 FOR IP): Performed by: STUDENT IN AN ORGANIZED HEALTH CARE EDUCATION/TRAINING PROGRAM

## 2025-07-28 PROCEDURE — 2700000000 HC OXYGEN THERAPY PER DAY

## 2025-07-28 PROCEDURE — 94660 CPAP INITIATION&MGMT: CPT

## 2025-07-28 RX ADMIN — FERROUS SULFATE TAB 325 MG (65 MG ELEMENTAL FE) 325 MG: 325 (65 FE) TAB at 09:05

## 2025-07-28 RX ADMIN — BUDESONIDE 500 MCG: 0.5 SUSPENSION RESPIRATORY (INHALATION) at 21:26

## 2025-07-28 RX ADMIN — ACETAMINOPHEN 650 MG: 325 TABLET ORAL at 17:17

## 2025-07-28 RX ADMIN — ACETAMINOPHEN 650 MG: 325 TABLET ORAL at 09:08

## 2025-07-28 RX ADMIN — ALBUTEROL SULFATE 2.5 MG: 2.5 SOLUTION RESPIRATORY (INHALATION) at 00:23

## 2025-07-28 RX ADMIN — IBUPROFEN 400 MG: 400 TABLET, FILM COATED ORAL at 05:58

## 2025-07-28 RX ADMIN — IBUPROFEN 400 MG: 400 TABLET, FILM COATED ORAL at 11:34

## 2025-07-28 RX ADMIN — PREDNISONE 20 MG: 20 TABLET ORAL at 09:05

## 2025-07-28 RX ADMIN — METOPROLOL SUCCINATE 25 MG: 25 TABLET, EXTENDED RELEASE ORAL at 09:05

## 2025-07-28 RX ADMIN — ALBUTEROL SULFATE 2.5 MG: 2.5 SOLUTION RESPIRATORY (INHALATION) at 12:45

## 2025-07-28 RX ADMIN — ENOXAPARIN SODIUM 40 MG: 100 INJECTION SUBCUTANEOUS at 09:05

## 2025-07-28 RX ADMIN — Medication 100 MG: at 09:05

## 2025-07-28 RX ADMIN — SODIUM CHLORIDE, PRESERVATIVE FREE 10 ML: 5 INJECTION INTRAVENOUS at 19:26

## 2025-07-28 RX ADMIN — PANTOPRAZOLE SODIUM 40 MG: 40 TABLET, DELAYED RELEASE ORAL at 05:58

## 2025-07-28 RX ADMIN — SODIUM CHLORIDE, PRESERVATIVE FREE 10 ML: 5 INJECTION INTRAVENOUS at 11:49

## 2025-07-28 RX ADMIN — ACETAMINOPHEN 650 MG: 325 TABLET ORAL at 03:03

## 2025-07-28 RX ADMIN — ARFORMOTEROL TARTRATE 15 MCG: 15 SOLUTION RESPIRATORY (INHALATION) at 08:09

## 2025-07-28 RX ADMIN — ARFORMOTEROL TARTRATE 15 MCG: 15 SOLUTION RESPIRATORY (INHALATION) at 21:26

## 2025-07-28 RX ADMIN — BUDESONIDE 500 MCG: 0.5 SUSPENSION RESPIRATORY (INHALATION) at 08:09

## 2025-07-28 RX ADMIN — ROSUVASTATIN CALCIUM 20 MG: 20 TABLET, FILM COATED ORAL at 19:26

## 2025-07-28 RX ADMIN — IBUPROFEN 400 MG: 400 TABLET, FILM COATED ORAL at 22:44

## 2025-07-28 RX ADMIN — ALBUTEROL SULFATE 2.5 MG: 2.5 SOLUTION RESPIRATORY (INHALATION) at 17:03

## 2025-07-28 ASSESSMENT — PAIN DESCRIPTION - ORIENTATION
ORIENTATION: RIGHT;LEFT
ORIENTATION: LEFT
ORIENTATION: RIGHT;LEFT
ORIENTATION: RIGHT;LEFT

## 2025-07-28 ASSESSMENT — PAIN DESCRIPTION - LOCATION
LOCATION: TEETH
LOCATION: HEAD
LOCATION: ABDOMEN
LOCATION: HEAD
LOCATION: TEETH;HEAD
LOCATION: HEAD

## 2025-07-28 ASSESSMENT — PAIN SCALES - GENERAL
PAINLEVEL_OUTOF10: 0
PAINLEVEL_OUTOF10: 3
PAINLEVEL_OUTOF10: 7
PAINLEVEL_OUTOF10: 0
PAINLEVEL_OUTOF10: 7
PAINLEVEL_OUTOF10: 5
PAINLEVEL_OUTOF10: 9
PAINLEVEL_OUTOF10: 0
PAINLEVEL_OUTOF10: 5
PAINLEVEL_OUTOF10: 0

## 2025-07-28 ASSESSMENT — PAIN - FUNCTIONAL ASSESSMENT
PAIN_FUNCTIONAL_ASSESSMENT: ACTIVITIES ARE NOT PREVENTED

## 2025-07-28 ASSESSMENT — PAIN DESCRIPTION - DESCRIPTORS
DESCRIPTORS: ACHING;DISCOMFORT;DULL
DESCRIPTORS: ACHING;DISCOMFORT;THROBBING
DESCRIPTORS: ACHING;SORE;THROBBING
DESCRIPTORS: ACHING;THROBBING;DISCOMFORT
DESCRIPTORS: ACHING;SORE;DULL
DESCRIPTORS: ACHING;DULL;SORE

## 2025-07-28 NOTE — PROGRESS NOTES
Attempted to call nurse-to-nurse to Amisha at [facility]. Was told that pt cannot be accepted d/t [facility] being a non-smoking facility. Charge nurse notified at this time.    Electronically signed by Estefania Raya RN on 7/28/2025 at 3:31 PM

## 2025-07-28 NOTE — CARE COORDINATION
Patient is a readmission from [facility], facility not able to accommodate AVAPS. Admitted for COPD exacerbation, will require placement at new facility. Referral made to Amisha at [facility], await to hear back.   Ana FRANCIS, RN  Care Management     Addendum: Discharge plan is Amisha at [facility] they are able to accept today, patient set up with [facility] stretcher today between 5-7pm. 7000, STEPHEN initiated. Envelope with demos and transport form in chart. Facility notified via careport. Nursing and patient notified.   Ana FRANCIS, RN  Care Management

## 2025-07-28 NOTE — PROGRESS NOTES
Spiritual Health History and Assessment/Progress Note  [facility]    Attempted Encounter,  ,  ,      Name: Anthony Tam MRN: [id number]    Age: 60 y.o.     Sex: male   Language: English   Buddhist: No Restorationism on file   Shortness of breath     Date: 7/28/2025                           Spiritual Assessment began in [facility] MED SURG        Referral/Consult From: Rounding   Encounter Overview/Reason: Attempted Encounter  Service Provided For: Patient not available    Maricarmen, Belief, Meaning:   Patient unable to assess at this time  Family/Friends No family/friends present      Importance and Influence:  Patient unable to assess at this time  Family/Friends No family/friends present    Community:  Patient feels well-supported. Support system includes: Parent/s, Children, and Extended family  Family/Friends No family/friends present    Assessment and Plan of Care:     Patient Interventions include: Other: Patient not available   Family/Friends Interventions include: No family/friends present    Patient Plan of Care: Spiritual Care available upon further referral  Family/Friends Plan of Care: No family/friends present    Electronically signed by Chaplain Clementina on 7/28/2025 at 11:11 AM

## 2025-07-28 NOTE — PROGRESS NOTES
Call placed to [facility] to cancel transport, [facility] unwilling to accept patient d/t current status as a smoker.    Electronically signed by Hilary Mann RN on 7/28/2025 at 3:32 PM

## 2025-07-28 NOTE — DISCHARGE INSTR - COC
Continuity of Care Form    Patient Name: Anthony Tam   :  1965  MRN:  [id number]    Admit date:  2025  Discharge date:  2025    Code Status Order: Full Code   Advance Directives:     Admitting Physician:  Clarisa Tobias MD  PCP: No primary care provider on file.    Discharging Nurse: Lashae Meehan RN  Discharging Hospital Unit/Room#: 0623/0623-A  Discharging Unit Phone Number: [phone]    Emergency Contact:   Extended Emergency Contact Information  Primary Emergency Contact: oSmmer Tello  Address: [address] [place] [address] of [place]  Home Phone: [phone]  Mobile Phone: [phone]  Relation: Other  Preferred language: English   needed? No  Secondary Emergency Contact: Faisal Tam  Home Phone: [phone]  Mobile Phone: [phone]  Relation: Brother/Sister    Past Surgical History:  History reviewed. No pertinent surgical history.    Immunization History:     There is no immunization history on file for this patient.    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    Insomnia G47.00    Ventricular arrhythmia I49.9    Acute on chronic respiratory failure with hypercapnia ([facility]) J96.22    Severe protein-energy malnutrition E43    Lactic acidosis E87.20    Acute on chronic respiratory failure ([facility]) J96.20    Shortness of breath R06.02    Tachycardia R00.0    COPD, severe (HCC) J44.9    Chronic anemia D64.9    Primary hypertension I10    Mixed hyperlipidemia E78.2    COPD (chronic obstructive pulmonary disease) (HCC) J44.9       Isolation/Infection:   Isolation            No Isolation          Patient Infection Status

## 2025-07-28 NOTE — PROGRESS NOTES
[facility] Hospitalist Progress Note    Admitting Date and Time: 7/25/2025  5:02 PM  Admit Dx: Palpitations [R00.2]  Shortness of breath [R06.02]  Sinus tachycardia [R00.0]  Tobacco abuse [Z72.0]  COPD with acute exacerbation (HCC) [J44.1]  Pulmonary emphysema, unspecified emphysema type (HCC) [J43.9]  COPD (chronic obstructive pulmonary disease) (HCC) [J44.9]    Subjective:  Patient is being followed for Palpitations [R00.2]  Shortness of breath [R06.02]  Sinus tachycardia [R00.0]  Tobacco abuse [Z72.0]  COPD with acute exacerbation (HCC) [J44.1]  Pulmonary emphysema, unspecified emphysema type (HCC) [J43.9]  COPD (chronic obstructive pulmonary disease) (HCC) [J44.9]   Pt seen and examined this morning  No acute events overnight  No acute distress    ROS: denies fever, chills, cp, sob, n/v, HA unless stated above.      arformoterol tartrate  15 mcg Nebulization BID RT    budesonide  0.5 mg Nebulization BID RT    predniSONE  20 mg Oral Daily    nicotine  1 patch TransDERmal Daily    sodium chloride flush  5-40 mL IntraVENous 2 times per day    enoxaparin  40 mg SubCUTAneous Daily    metoprolol succinate  25 mg Oral QAM    pantoprazole  40 mg Oral QAM AC    ferrous sulfate  325 mg Oral QAM    rosuvastatin  20 mg Oral Nightly    thiamine  100 mg Oral QAM     ibuprofen, 400 mg, Q6H PRN  acetaminophen, 650 mg, Q6H PRN   Or  acetaminophen, 650 mg, Q6H PRN  sodium chloride flush, 5-40 mL, PRN  sodium chloride, , PRN  potassium chloride, 40 mEq, PRN   Or  potassium alternative oral replacement, 40 mEq, PRN   Or  potassium chloride, 10 mEq, PRN  magnesium sulfate, 2,000 mg, PRN  ondansetron, 4 mg, Q8H PRN   Or  ondansetron, 4 mg, Q6H PRN  polyethylene glycol, 17 g, Daily PRN  melatonin, 9 mg, Nightly PRN  albuterol, 2.5 mg, 4x Daily PRN         Objective:    /62   Pulse 97   Temp 98.6 °F (37 °C) (Oral)   Resp 20   Ht 1.753 m (5' 9\")   Wt 56.7 kg (125 lb)   SpO2 100%   BMI 18.46 kg/m²     General

## 2025-07-29 VITALS
HEIGHT: 69 IN | RESPIRATION RATE: 18 BRPM | SYSTOLIC BLOOD PRESSURE: 95 MMHG | BODY MASS INDEX: 17.31 KG/M2 | TEMPERATURE: 98.1 F | OXYGEN SATURATION: 100 % | WEIGHT: 116.9 LBS | HEART RATE: 47 BPM | DIASTOLIC BLOOD PRESSURE: 46 MMHG

## 2025-07-29 PROCEDURE — 94640 AIRWAY INHALATION TREATMENT: CPT

## 2025-07-29 PROCEDURE — 2500000003 HC RX 250 WO HCPCS: Performed by: STUDENT IN AN ORGANIZED HEALTH CARE EDUCATION/TRAINING PROGRAM

## 2025-07-29 PROCEDURE — 6360000002 HC RX W HCPCS: Performed by: STUDENT IN AN ORGANIZED HEALTH CARE EDUCATION/TRAINING PROGRAM

## 2025-07-29 PROCEDURE — 99239 HOSP IP/OBS DSCHRG MGMT >30: CPT | Performed by: INTERNAL MEDICINE

## 2025-07-29 PROCEDURE — 6370000000 HC RX 637 (ALT 250 FOR IP): Performed by: STUDENT IN AN ORGANIZED HEALTH CARE EDUCATION/TRAINING PROGRAM

## 2025-07-29 PROCEDURE — 94660 CPAP INITIATION&MGMT: CPT

## 2025-07-29 PROCEDURE — 2700000000 HC OXYGEN THERAPY PER DAY

## 2025-07-29 RX ADMIN — SODIUM CHLORIDE, PRESERVATIVE FREE 10 ML: 5 INJECTION INTRAVENOUS at 20:11

## 2025-07-29 RX ADMIN — IBUPROFEN 400 MG: 400 TABLET, FILM COATED ORAL at 11:12

## 2025-07-29 RX ADMIN — ENOXAPARIN SODIUM 40 MG: 100 INJECTION SUBCUTANEOUS at 08:09

## 2025-07-29 RX ADMIN — ALBUTEROL SULFATE 2.5 MG: 2.5 SOLUTION RESPIRATORY (INHALATION) at 19:26

## 2025-07-29 RX ADMIN — METOPROLOL SUCCINATE 25 MG: 25 TABLET, EXTENDED RELEASE ORAL at 08:08

## 2025-07-29 RX ADMIN — ACETAMINOPHEN 650 MG: 325 TABLET ORAL at 16:31

## 2025-07-29 RX ADMIN — ALBUTEROL SULFATE 2.5 MG: 2.5 SOLUTION RESPIRATORY (INHALATION) at 07:38

## 2025-07-29 RX ADMIN — ARFORMOTEROL TARTRATE 15 MCG: 15 SOLUTION RESPIRATORY (INHALATION) at 07:38

## 2025-07-29 RX ADMIN — SODIUM CHLORIDE, PRESERVATIVE FREE 10 ML: 5 INJECTION INTRAVENOUS at 08:09

## 2025-07-29 RX ADMIN — ALBUTEROL SULFATE 2.5 MG: 2.5 SOLUTION RESPIRATORY (INHALATION) at 04:53

## 2025-07-29 RX ADMIN — Medication 100 MG: at 08:08

## 2025-07-29 RX ADMIN — ARFORMOTEROL TARTRATE 15 MCG: 15 SOLUTION RESPIRATORY (INHALATION) at 19:26

## 2025-07-29 RX ADMIN — IBUPROFEN 400 MG: 400 TABLET, FILM COATED ORAL at 22:03

## 2025-07-29 RX ADMIN — BUDESONIDE 500 MCG: 0.5 SUSPENSION RESPIRATORY (INHALATION) at 19:26

## 2025-07-29 RX ADMIN — PREDNISONE 20 MG: 20 TABLET ORAL at 08:08

## 2025-07-29 RX ADMIN — FERROUS SULFATE TAB 325 MG (65 MG ELEMENTAL FE) 325 MG: 325 (65 FE) TAB at 08:08

## 2025-07-29 RX ADMIN — ACETAMINOPHEN 650 MG: 325 TABLET ORAL at 08:08

## 2025-07-29 RX ADMIN — ALBUTEROL SULFATE 2.5 MG: 2.5 SOLUTION RESPIRATORY (INHALATION) at 15:37

## 2025-07-29 RX ADMIN — BUDESONIDE 500 MCG: 0.5 SUSPENSION RESPIRATORY (INHALATION) at 07:38

## 2025-07-29 RX ADMIN — PANTOPRAZOLE SODIUM 40 MG: 40 TABLET, DELAYED RELEASE ORAL at 05:03

## 2025-07-29 RX ADMIN — ALBUTEROL SULFATE 2.5 MG: 2.5 SOLUTION RESPIRATORY (INHALATION) at 12:19

## 2025-07-29 RX ADMIN — ROSUVASTATIN CALCIUM 20 MG: 20 TABLET, FILM COATED ORAL at 20:11

## 2025-07-29 ASSESSMENT — PAIN SCALES - WONG BAKER: WONGBAKER_NUMERICALRESPONSE: NO HURT

## 2025-07-29 ASSESSMENT — PAIN SCALES - GENERAL
PAINLEVEL_OUTOF10: 0
PAINLEVEL_OUTOF10: 0
PAINLEVEL_OUTOF10: 4
PAINLEVEL_OUTOF10: 4
PAINLEVEL_OUTOF10: 0
PAINLEVEL_OUTOF10: 3

## 2025-07-29 ASSESSMENT — PAIN DESCRIPTION - LOCATION
LOCATION: TEETH
LOCATION: HEAD
LOCATION: TEETH

## 2025-07-29 ASSESSMENT — PAIN DESCRIPTION - ORIENTATION
ORIENTATION: RIGHT;LEFT
ORIENTATION: RIGHT;LEFT;MID
ORIENTATION: RIGHT;LEFT

## 2025-07-29 ASSESSMENT — PAIN - FUNCTIONAL ASSESSMENT
PAIN_FUNCTIONAL_ASSESSMENT: ACTIVITIES ARE NOT PREVENTED

## 2025-07-29 ASSESSMENT — PAIN DESCRIPTION - DESCRIPTORS
DESCRIPTORS: ACHING

## 2025-07-29 NOTE — PROGRESS NOTES
Waiting to hear back from [place] ensuring NIV is in their facility prior to DC.    Electronically signed by Hilary Mann RN on 7/29/2025 at 3:14 PM

## 2025-07-29 NOTE — PLAN OF CARE
Problem: Discharge Planning  Goal: Discharge to home or other facility with appropriate resources  7/26/2025 2258 by Meagan Veras RN  Outcome: Progressing  7/26/2025 1619 by Zaira Brito RN  Outcome: Progressing     Problem: Safety - Adult  Goal: Free from fall injury  7/26/2025 2258 by Meagan Veras RN  Outcome: Progressing  7/26/2025 1619 by Zaira Brito RN  Outcome: Progressing     Problem: ABCDS Injury Assessment  Goal: Absence of physical injury  7/26/2025 2258 by Meagan Veras RN  Outcome: Progressing  7/26/2025 1619 by Zaira Brito RN  Outcome: Progressing     Problem: Respiratory - Adult  Goal: Achieves optimal ventilation and oxygenation  7/26/2025 2258 by Meagan Veras RN  Outcome: Progressing  7/26/2025 1619 by Zaira Brito RN  Outcome: Progressing     Problem: Cardiovascular - Adult  Goal: Maintains optimal cardiac output and hemodynamic stability  7/26/2025 2258 by Meagan Veras RN  Outcome: Progressing  7/26/2025 1619 by Zaira Brito RN  Outcome: Progressing  Goal: Absence of cardiac dysrhythmias or at baseline  7/26/2025 2258 by Meagan Veras RN  Outcome: Progressing  7/26/2025 1619 by Zaira Brito RN  Outcome: Progressing     Problem: Skin/Tissue Integrity - Adult  Goal: Skin integrity remains intact  7/26/2025 2258 by Meagan Veras RN  Outcome: Progressing  7/26/2025 1619 by Zaira Brito RN  Outcome: Progressing  Flowsheets (Taken 7/26/2025 0818)  Skin Integrity Remains Intact: Monitor for areas of redness and/or skin breakdown  Goal: Incisions, wounds, or drain sites healing without S/S of infection  Outcome: Progressing     Problem: Musculoskeletal - Adult  Goal: Return mobility to safest level of function  7/26/2025 2258 by Meagan Veras RN  Outcome: Progressing  7/26/2025 1619 by Zaira Brito RN  Outcome: Progressing  Goal: Maintain proper alignment of affected body part  7/26/2025 2258 by Meagan Veras RN  Outcome: 
  Problem: Discharge Planning  Goal: Discharge to home or other facility with appropriate resources  7/27/2025 1040 by Estefania Raya RN  Outcome: Progressing  7/26/2025 2258 by Meagan Veras RN  Outcome: Progressing     Problem: Safety - Adult  Goal: Free from fall injury  7/27/2025 1040 by Estefania Raya RN  Outcome: Progressing  7/26/2025 2258 by Meagan Veras RN  Outcome: Progressing     Problem: ABCDS Injury Assessment  Goal: Absence of physical injury  7/27/2025 1040 by Estefania Raya RN  Outcome: Progressing  7/26/2025 2258 by Meagan Veras RN  Outcome: Progressing     Problem: Respiratory - Adult  Goal: Achieves optimal ventilation and oxygenation  7/27/2025 1040 by Estefania Raya RN  Outcome: Progressing  7/26/2025 2258 by Meagan Veras RN  Outcome: Progressing     Problem: Cardiovascular - Adult  Goal: Maintains optimal cardiac output and hemodynamic stability  7/27/2025 1040 by Estefania Raya RN  Outcome: Progressing  7/26/2025 2258 by Meagan Veras RN  Outcome: Progressing  Goal: Absence of cardiac dysrhythmias or at baseline  7/27/2025 1040 by Estefania Raya RN  Outcome: Progressing  7/26/2025 2258 by Meagan Veras RN  Outcome: Progressing     Problem: Skin/Tissue Integrity - Adult  Goal: Skin integrity remains intact  7/27/2025 1040 by Estefania Raya RN  Outcome: Progressing  7/26/2025 2258 by Meagan Veras RN  Outcome: Progressing  Goal: Incisions, wounds, or drain sites healing without S/S of infection  7/27/2025 1040 by Estefania Raya RN  Outcome: Progressing  7/26/2025 2258 by Meagan Veras RN  Outcome: Progressing     Problem: Musculoskeletal - Adult  Goal: Return mobility to safest level of function  7/27/2025 1040 by Estefania Raya RN  Outcome: Progressing  7/26/2025 2258 by Meagan Veras RN  Outcome: Progressing  Goal: Maintain proper alignment of affected body part  7/27/2025 1040 by Estefania Raya RN  Outcome: Progressing  7/26/2025 2258 by Eda 
  Problem: Discharge Planning  Goal: Discharge to home or other facility with appropriate resources  7/27/2025 2239 by Meagan Veras RN  Outcome: Progressing  7/27/2025 1040 by Estefania Raya RN  Outcome: Progressing     Problem: Safety - Adult  Goal: Free from fall injury  7/27/2025 2239 by Meagan Veras RN  Outcome: Progressing  7/27/2025 1040 by Estefania Raya RN  Outcome: Progressing     Problem: ABCDS Injury Assessment  Goal: Absence of physical injury  7/27/2025 2239 by Meagan Veras RN  Outcome: Progressing  7/27/2025 1040 by Estefania Raya RN  Outcome: Progressing     Problem: Respiratory - Adult  Goal: Achieves optimal ventilation and oxygenation  7/27/2025 2239 by Meagan Veras RN  Outcome: Progressing  7/27/2025 1040 by Estefania Raya RN  Outcome: Progressing     Problem: Cardiovascular - Adult  Goal: Maintains optimal cardiac output and hemodynamic stability  7/27/2025 2239 by eMagan Veras RN  Outcome: Progressing  7/27/2025 1040 by Estefania Raya RN  Outcome: Progressing  Goal: Absence of cardiac dysrhythmias or at baseline  7/27/2025 2239 by Meagan Veras RN  Outcome: Progressing  7/27/2025 1040 by Estefania Raya RN  Outcome: Progressing     Problem: Skin/Tissue Integrity - Adult  Goal: Skin integrity remains intact  7/27/2025 2239 by Meagan Veras RN  Outcome: Progressing  7/27/2025 1040 by Estefania Raya RN  Outcome: Progressing  Goal: Incisions, wounds, or drain sites healing without S/S of infection  7/27/2025 2239 by Meagan Veras RN  Outcome: Progressing  7/27/2025 1040 by Estefania Raya RN  Outcome: Progressing     Problem: Musculoskeletal - Adult  Goal: Return mobility to safest level of function  7/27/2025 2239 by Meagan Veras RN  Outcome: Progressing  7/27/2025 1040 by Estefania Raya RN  Outcome: Progressing  Goal: Maintain proper alignment of affected body part  7/27/2025 2239 by Meagan Veras RN  Outcome: Progressing  7/27/2025 1040 by 
  Problem: Discharge Planning  Goal: Discharge to home or other facility with appropriate resources  7/28/2025 1333 by Estefania Raya RN  Outcome: Adequate for Discharge  7/28/2025 1011 by Estefania Raya RN  Outcome: Progressing     Problem: Safety - Adult  Goal: Free from fall injury  7/28/2025 1333 by Estefania Raya RN  Outcome: Adequate for Discharge  7/28/2025 1011 by Estefania Raya RN  Outcome: Progressing     Problem: ABCDS Injury Assessment  Goal: Absence of physical injury  7/28/2025 1333 by Estefania Raya RN  Outcome: Adequate for Discharge  7/28/2025 1011 by Estefania Raya RN  Outcome: Progressing     Problem: Respiratory - Adult  Goal: Achieves optimal ventilation and oxygenation  7/28/2025 1333 by Estefania Raya RN  Outcome: Adequate for Discharge  7/28/2025 1011 by Estefania Raya RN  Outcome: Progressing     Problem: Cardiovascular - Adult  Goal: Maintains optimal cardiac output and hemodynamic stability  7/28/2025 1333 by Estefania Raya RN  Outcome: Adequate for Discharge  7/28/2025 1011 by Estefania Raya RN  Outcome: Progressing  Goal: Absence of cardiac dysrhythmias or at baseline  7/28/2025 1333 by Estefania Raya RN  Outcome: Adequate for Discharge  7/28/2025 1011 by Estefania Raya RN  Outcome: Progressing     Problem: Skin/Tissue Integrity - Adult  Goal: Skin integrity remains intact  7/28/2025 1333 by Estefania Raya RN  Outcome: Adequate for Discharge  7/28/2025 1011 by Estefania Raya RN  Outcome: Progressing  Goal: Incisions, wounds, or drain sites healing without S/S of infection  7/28/2025 1333 by Estefania Raya RN  Outcome: Adequate for Discharge  7/28/2025 1011 by Estefania Raya RN  Outcome: Progressing     Problem: Musculoskeletal - Adult  Goal: Return mobility to safest level of function  7/28/2025 1333 by Estefania Raya RN  Outcome: Adequate for Discharge  7/28/2025 1011 by Estefania Raya RN  Outcome: Progressing  Goal: Maintain proper alignment of affected body part  7/28/2025 1333 by Estefania Raya 
  Problem: Discharge Planning  Goal: Discharge to home or other facility with appropriate resources  Outcome: Progressing     Problem: Safety - Adult  Goal: Free from fall injury  Outcome: Progressing     Problem: ABCDS Injury Assessment  Goal: Absence of physical injury  Outcome: Progressing     Problem: Respiratory - Adult  Goal: Achieves optimal ventilation and oxygenation  Outcome: Progressing     Problem: Cardiovascular - Adult  Goal: Maintains optimal cardiac output and hemodynamic stability  Outcome: Progressing  Goal: Absence of cardiac dysrhythmias or at baseline  Outcome: Progressing     Problem: Skin/Tissue Integrity - Adult  Goal: Skin integrity remains intact  Outcome: Progressing  Goal: Incisions, wounds, or drain sites healing without S/S of infection  Outcome: Progressing     Problem: Musculoskeletal - Adult  Goal: Return mobility to safest level of function  Outcome: Progressing  Goal: Maintain proper alignment of affected body part  Outcome: Progressing  Goal: Return ADL status to a safe level of function  Outcome: Progressing     Problem: Hematologic - Adult  Goal: Maintains hematologic stability  Outcome: Progressing     
  Problem: Pain  Goal: Verbalizes/displays adequate comfort level or baseline comfort level  7/29/2025 1836 by Lashae Meehan, RN  Outcome: Adequate for Discharge  7/29/2025 1145 by Lashae Meehan, RN  Outcome: Progressing     
  Problem: Pain  Goal: Verbalizes/displays adequate comfort level or baseline comfort level  Outcome: Progressing     
  Problem: Pain  Goal: Verbalizes/displays adequate comfort level or baseline comfort level  Outcome: Progressing     
  Problem: Respiratory - Adult  Goal: Achieves optimal ventilation and oxygenation  Outcome: Progressing     Problem: Discharge Planning  Goal: Discharge to home or other facility with appropriate resources  Outcome: Progressing     Problem: Cardiovascular - Adult  Goal: Maintains optimal cardiac output and hemodynamic stability  Outcome: Progressing  Goal: Absence of cardiac dysrhythmias or at baseline  Outcome: Progressing     Problem: ABCDS Injury Assessment  Goal: Absence of physical injury  Outcome: Progressing     Problem: Safety - Adult  Goal: Free from fall injury  Outcome: Progressing     Problem: Skin/Tissue Integrity - Adult  Goal: Skin integrity remains intact  Outcome: Progressing  Flowsheets (Taken 7/26/2025 0818)  Skin Integrity Remains Intact: Monitor for areas of redness and/or skin breakdown     Problem: Musculoskeletal - Adult  Goal: Return mobility to safest level of function  Outcome: Progressing  Goal: Maintain proper alignment of affected body part  Outcome: Progressing  Goal: Return ADL status to a safe level of function  Outcome: Progressing     Problem: Hematologic - Adult  Goal: Maintains hematologic stability  Outcome: Progressing     
Meagan Nelson, RN  Outcome: Progressing  Goal: Return ADL status to a safe level of function  7/28/2025 1011 by Estefania Raya RN  Outcome: Progressing  7/27/2025 2239 by Meagan Veras RN  Outcome: Progressing     Problem: Hematologic - Adult  Goal: Maintains hematologic stability  7/28/2025 1011 by Estefania Raya RN  Outcome: Progressing  7/27/2025 2239 by Meagan Veras, RN  Outcome: Progressing     Problem: Pain  Goal: Verbalizes/displays adequate comfort level or baseline comfort level  7/28/2025 1011 by Estefania Raya RN  Outcome: Progressing  7/27/2025 2239 by Meagan Veras, RN  Outcome: Progressing

## 2025-07-29 NOTE — PROGRESS NOTES
Attempted to call to give nurse to nurse to [facility]. Report given to Cecy via telephone. Manager verified NIV at facility. All questions and concerns addressed.

## 2025-07-29 NOTE — PROGRESS NOTES
Dr. Chinchilla notified that [facility] able to accept patient.    Electronically signed by Hilary Mann RN on 7/29/2025 at 1:52 PM

## 2025-07-29 NOTE — CARE COORDINATION
Patient declined  Amisha at [facility], referral made to [facility], accepted. 7000, STEPHEN initiated, envelope with demos and transport form in chart.  Ana PARKSN, RN  Care Management

## 2025-07-29 NOTE — PROGRESS NOTES
[facility] Hospitalist Progress Note    Admitting Date and Time: 7/25/2025  5:02 PM  Admit Dx: Palpitations [R00.2]  Shortness of breath [R06.02]  Sinus tachycardia [R00.0]  Tobacco abuse [Z72.0]  COPD with acute exacerbation (HCC) [J44.1]  Pulmonary emphysema, unspecified emphysema type (HCC) [J43.9]  COPD (chronic obstructive pulmonary disease) (HCC) [J44.9]    Subjective:  Patient is being followed for Palpitations [R00.2]  Shortness of breath [R06.02]  Sinus tachycardia [R00.0]  Tobacco abuse [Z72.0]  COPD with acute exacerbation (HCC) [J44.1]  Pulmonary emphysema, unspecified emphysema type (HCC) [J43.9]  COPD (chronic obstructive pulmonary disease) (HCC) [J44.9]   Pt seen and examined this morning  No acute events overnight  No acute distress    ROS: denies fever, chills, cp, sob, n/v, HA unless stated above.      arformoterol tartrate  15 mcg Nebulization BID RT    budesonide  0.5 mg Nebulization BID RT    predniSONE  20 mg Oral Daily    nicotine  1 patch TransDERmal Daily    sodium chloride flush  5-40 mL IntraVENous 2 times per day    enoxaparin  40 mg SubCUTAneous Daily    metoprolol succinate  25 mg Oral QAM    pantoprazole  40 mg Oral QAM AC    ferrous sulfate  325 mg Oral QAM    rosuvastatin  20 mg Oral Nightly    thiamine  100 mg Oral QAM     ibuprofen, 400 mg, Q6H PRN  acetaminophen, 650 mg, Q6H PRN   Or  acetaminophen, 650 mg, Q6H PRN  sodium chloride flush, 5-40 mL, PRN  sodium chloride, , PRN  potassium chloride, 40 mEq, PRN   Or  potassium alternative oral replacement, 40 mEq, PRN   Or  potassium chloride, 10 mEq, PRN  magnesium sulfate, 2,000 mg, PRN  ondansetron, 4 mg, Q8H PRN   Or  ondansetron, 4 mg, Q6H PRN  polyethylene glycol, 17 g, Daily PRN  melatonin, 9 mg, Nightly PRN  albuterol, 2.5 mg, 4x Daily PRN         Objective:    /75   Pulse (!) 106   Temp 98.4 °F (36.9 °C) (Oral)   Resp 22   Ht 1.753 m (5' 9\")   Wt 53 kg (116 lb 14.4 oz)   SpO2 100%   BMI 17.26 kg/m²

## 2025-07-30 NOTE — PROGRESS NOTES
Physician Progress Note      PATIENT:               CYNDEE BAL  CSN #:                  [id number]  :                       1965  ADMIT DATE:       2025 5:02 PM  DISCH DATE:        2025 11:49 PM  RESPONDING  PROVIDER #:        Joey Chinchilla MD          QUERY TEXT:    Acute respiratory failure is documented in the medical record IM progress note    Dr. Chinchilla. Please provide additional clinical indicators supportive of   your documentation. Or please document if the diagnosis of acute respiratory   failure has been ruled out after study.    The clinical indicators include:  Per IM progress note  Dr. Chinchilla:  - Severe COPD  - Acute on Chronic Hypoxic and Hypercapnic Respiratory Failure  - Continue BiPAP, NIV- AVAPS  - Patient unable to obtain NIV with AVAPS at NH thus sent back.  - Continue Brovana, pulmicort, dunebs, prednisone, incentive spirometry  - Patient will require NIV device at home/ NH. SS consulted    Per H&P  Dr. Price:  - presented to ED with tachycardia  -  is on chronic oxygen secondary to severe COPD.  - He was participating in physical therapy when he started feeling short of   breath without chest pain and felt his heart racing, was sent to ED for   evaluation.  - Workup in ED was essentially benign  - To keep patient for observation until sent back to the facility.  - Sinus tachycardia-likely secondary to underlying COPD, trending back, no   other underlying etiologies found, will keep patient for observation until   discharged.  - Severe COPD-not in exacerbation  - in no acute distress...normal air movement, no respiratory distress  - /88   Pulse (!) 104   Temp 98.1 ?F (36.7 ?C)   Resp 16   Ht 1.753   m (5' 9\")   Wt 56.7 kg (125 lb)   SpO2 95%   BMI 18.46 kg/m?    Per ED  Dr. Feldman:  - He is not in acute distress.  - Pulmonary effort is normal. No accessory muscle usage or respiratory   distress.  - Patient was given breathing treatments and

## 2025-07-30 NOTE — DISCHARGE SUMMARY
[facility] Hospitalist Physician Discharge Summary       Family doctor    Schedule an appointment as soon as possible for a visit in 3 days  call [phone] for [facility], if you do not have a family doctor    Kristen Hess MD  [address] [facility] [address]  [phone]    Schedule an appointment as soon as possible for a visit in 1 week  pulmonary    [facility]  [address] [place], [place] [id number]  [phone]          Activity level: As tolerated     Dispo: [facility]    Condition on discharge: Stable     Patient ID:  Anthony Tam  [id number]  60 y.o.  1965    Admit date: 7/25/2025    Discharge date and time:  7/30/2025  4:21 PM    Admission Diagnoses: Principal Problem:    Shortness of breath  Active Problems:    COPD with acute exacerbation (HCC)    Tachycardia    COPD, severe (HCC)    Chronic anemia    Primary hypertension    Mixed hyperlipidemia    COPD (chronic obstructive pulmonary disease) (HCC)  Resolved Problems:    * No resolved hospital problems. *      Discharge Diagnoses: Principal Problem:    Shortness of breath  Active Problems:    COPD with acute exacerbation (HCC)    Tachycardia    COPD, severe (HCC)    Chronic anemia    Primary hypertension    Mixed hyperlipidemia    COPD (chronic obstructive pulmonary disease) (HCC)  Resolved Problems:    * No resolved hospital problems. *      Consults:  IP CONSULT TO HOSPITALIST  IP CONSULT TO SOCIAL WORK    Hospital Course:   Patient Anthony Tam is a 60 y.o. presented with Palpitations [R00.2]  Shortness of breath [R06.02]  Sinus tachycardia [R00.0]  Tobacco abuse [Z72.0]  COPD with acute exacerbation (HCC) [J44.1]  Pulmonary emphysema, unspecified emphysema type (HCC) [J43.9]  COPD (chronic obstructive pulmonary disease) (HCC) [J44.9]    Patient is 60-year-old male who was brought back to the hospital from nursing home as they are unable to obtain AVAPS for patient.  Of note, he 
reduction techniques:  automated exposure control, adjustment of the mA and/or kV according to patient size, and/or use of iterative reconstruction technique. MIP reconstructed images were created and reviewed. COMPARISON: No relevant prior studies available. FINDINGS: Pulmonary arteries:  Contrast opacification of the pulmonary arterial system is slightly suboptimal but given this limitation no evidence of any acute pulmonary emboli. Aorta:  No acute findings.  No thoracic aortic aneurysm. Lungs and pleural spaces:  Bullous changes noted involving a significant portion of the mid and inferior right lower lobe with more typical emphysematous changes seen throughout the remainder of the lungs.  Some right lower lobe lung scarring is noted. No acute pulmonary abnormality is noted. No mass.  No consolidation.  No significant effusion.  No pneumothorax. Heart:  No acute findings.  No cardiomegaly.  No significant pericardial effusion.  No evidence of RV dysfunction. Bones/joints:  No acute fracture.  No dislocation. Soft tissues:  No acute findings. Lymph nodes:  No acute findings.  No enlarged lymph nodes.     1.  Contrast opacification of the pulmonary arterial system is slightly suboptimal but given this limitation no evidence of any acute pulmonary emboli. 2.  Bullous changes noted involving a significant portion of the mid and inferior right lower lobe with more typical emphysematous changes seen throughout the remainder of the lungs.  Some right lower lobe lung scarring is noted. No acute pulmonary abnormality is noted. 3.  Otherwise no acute pathology noted.     XR CHEST (2 VW)  Result Date: 7/25/2025  EXAMINATION: TWO XRAY VIEWS OF THE CHEST 7/25/2025 7:36 pm COMPARISON: 07/15/2025 HISTORY: ORDERING SYSTEM PROVIDED HISTORY: Pain TECHNOLOGIST PROVIDED HISTORY: Reason for exam:->Pain FINDINGS: The lungs are without acute focal process.  There is no effusion or pneumothorax. The cardiomediastinal silhouette is

## 2025-07-31 NOTE — DISCHARGE SUMMARY
[facility] Hospitalist Physician Discharge Summary       [facility]  [address] [facility] [facility] [address]  [phone]        [facility] Physicians Pre-Service  [phone]  Schedule an appointment as soon as possible for a visit in 1 week(s)  hospital follow up    Richa Rod DPM  [address] [facility] [address]  [phone]    Schedule an appointment as soon as possible for a visit      Joe Burnett DO  [address] [facility] [address]  [phone]    Schedule an appointment as soon as possible for a visit in 1 week(s)        Activity level: As tolerated     Dispo: SNF      Condition on discharge: Stable     Patient ID:  Anthony Tam  [id number]  60 y.o.  1965    Admit date: 7/15/2025    Discharge date and time:  7/31/2025  2:11 AM    Admission Diagnoses: Principal Problem:    Acute on chronic respiratory failure with hypercapnia (HCC)  Active Problems:    Alcohol abuse    COPD with acute exacerbation (HCC)    Gastroesophageal reflux disease    Heavy tobacco smoker    Immunoglobulin A deficiency (HCC)    Insomnia    Severe protein-energy malnutrition    Lactic acidosis    Acute on chronic respiratory failure (HCC)  Resolved Problems:    * No resolved hospital problems. *      Discharge Diagnoses: Principal Problem:    Acute on chronic respiratory failure with hypercapnia (HCC)  Active Problems:    Alcohol abuse    COPD with acute exacerbation (HCC)    Gastroesophageal reflux disease    Heavy tobacco smoker    Immunoglobulin A deficiency (HCC)    Insomnia    Severe protein-energy malnutrition    Lactic acidosis    Acute on chronic respiratory failure (HCC)  Resolved Problems:    * No resolved hospital problems. *      Consults:  IP CONSULT TO PULMONOLOGY  IP CONSULT TO DIETITIAN  PHARMACY TO DOSE VANCOMYCIN  IP CONSULT TO INFECTIOUS DISEASES  IP CONSULT TO PODIATRY  IP CONSULT TO GI    Hospital Course:   Patient Anthony Tam is a 60 y.o. presented with

## 2025-08-29 ENCOUNTER — HOSPITAL ENCOUNTER (EMERGENCY)
Age: 60
Discharge: HOME | End: 2025-08-29
Attending: STUDENT IN AN ORGANIZED HEALTH CARE EDUCATION/TRAINING PROGRAM
Payer: MEDICARE

## 2025-08-29 ENCOUNTER — APPOINTMENT (OUTPATIENT)
Dept: GENERAL RADIOLOGY | Age: 60
End: 2025-08-29
Payer: MEDICARE

## 2025-08-29 VITALS
SYSTOLIC BLOOD PRESSURE: 123 MMHG | OXYGEN SATURATION: 100 % | DIASTOLIC BLOOD PRESSURE: 83 MMHG | HEART RATE: 100 BPM | RESPIRATION RATE: 18 BRPM | TEMPERATURE: 98.6 F

## 2025-08-29 DIAGNOSIS — R00.0 TACHYCARDIA: Primary | ICD-10-CM

## 2025-08-29 DIAGNOSIS — E87.0 HYPERNATREMIA: ICD-10-CM

## 2025-08-29 LAB
ALBUMIN SERPL-MCNC: 4.1 G/DL (ref 3.5–5.2)
ALP SERPL-CCNC: 79 U/L (ref 40–129)
ALT SERPL-CCNC: 38 U/L (ref 0–50)
ANION GAP SERPL CALCULATED.3IONS-SCNC: 11 MMOL/L (ref 7–16)
AST SERPL-CCNC: 33 U/L (ref 0–50)
BASOPHILS # BLD: 0.04 K/UL (ref 0–0.2)
BASOPHILS NFR BLD: 1 % (ref 0–2)
BILIRUB SERPL-MCNC: <0.2 MG/DL (ref 0–1.2)
BUN SERPL-MCNC: 14 MG/DL (ref 8–23)
CALCIUM SERPL-MCNC: 9.5 MG/DL (ref 8.8–10.2)
CHLORIDE SERPL-SCNC: 105 MMOL/L (ref 98–107)
CO2 SERPL-SCNC: 37 MMOL/L (ref 22–29)
CREAT SERPL-MCNC: 0.8 MG/DL (ref 0.7–1.2)
EOSINOPHIL # BLD: 0.14 K/UL (ref 0.05–0.5)
EOSINOPHILS RELATIVE PERCENT: 3 % (ref 0–6)
ERYTHROCYTE [DISTWIDTH] IN BLOOD BY AUTOMATED COUNT: 12.6 % (ref 11.5–15)
GFR, ESTIMATED: >90 ML/MIN/1.73M2
GLUCOSE SERPL-MCNC: 101 MG/DL (ref 74–99)
HCT VFR BLD AUTO: 39.1 % (ref 37–54)
HGB BLD-MCNC: 12.2 G/DL (ref 12.5–16.5)
IMM GRANULOCYTES # BLD AUTO: <0.03 K/UL (ref 0–0.58)
IMM GRANULOCYTES NFR BLD: 0 % (ref 0–5)
LYMPHOCYTES NFR BLD: 1.59 K/UL (ref 1.5–4)
LYMPHOCYTES RELATIVE PERCENT: 28 % (ref 20–42)
MCH RBC QN AUTO: 32.6 PG (ref 26–35)
MCHC RBC AUTO-ENTMCNC: 31.2 G/DL (ref 32–34.5)
MCV RBC AUTO: 104.5 FL (ref 80–99.9)
MONOCYTES NFR BLD: 0.51 K/UL (ref 0.1–0.95)
MONOCYTES NFR BLD: 9 % (ref 2–12)
NEUTROPHILS NFR BLD: 60 % (ref 43–80)
NEUTS SEG NFR BLD: 3.41 K/UL (ref 1.8–7.3)
PLATELET # BLD AUTO: 192 K/UL (ref 130–450)
PMV BLD AUTO: 11.2 FL (ref 7–12)
POTASSIUM SERPL-SCNC: 4.2 MMOL/L (ref 3.5–5.1)
PROT SERPL-MCNC: 6.9 G/DL (ref 6.4–8.3)
RBC # BLD AUTO: 3.74 M/UL (ref 3.8–5.8)
SODIUM SERPL-SCNC: 152 MMOL/L (ref 136–145)
TROPONIN I SERPL HS-MCNC: 22 NG/L (ref 0–22)
TROPONIN I SERPL HS-MCNC: 28 NG/L (ref 0–22)
WBC OTHER # BLD: 5.7 K/UL (ref 4.5–11.5)

## 2025-08-29 PROCEDURE — 2580000003 HC RX 258: Performed by: STUDENT IN AN ORGANIZED HEALTH CARE EDUCATION/TRAINING PROGRAM

## 2025-08-29 PROCEDURE — 96360 HYDRATION IV INFUSION INIT: CPT

## 2025-08-29 PROCEDURE — 94640 AIRWAY INHALATION TREATMENT: CPT

## 2025-08-29 PROCEDURE — 99285 EMERGENCY DEPT VISIT HI MDM: CPT

## 2025-08-29 PROCEDURE — 96361 HYDRATE IV INFUSION ADD-ON: CPT

## 2025-08-29 PROCEDURE — 6360000002 HC RX W HCPCS: Performed by: STUDENT IN AN ORGANIZED HEALTH CARE EDUCATION/TRAINING PROGRAM

## 2025-08-29 PROCEDURE — 84484 ASSAY OF TROPONIN QUANT: CPT

## 2025-08-29 PROCEDURE — 6370000000 HC RX 637 (ALT 250 FOR IP): Performed by: STUDENT IN AN ORGANIZED HEALTH CARE EDUCATION/TRAINING PROGRAM

## 2025-08-29 PROCEDURE — 93005 ELECTROCARDIOGRAM TRACING: CPT | Performed by: STUDENT IN AN ORGANIZED HEALTH CARE EDUCATION/TRAINING PROGRAM

## 2025-08-29 PROCEDURE — 71046 X-RAY EXAM CHEST 2 VIEWS: CPT

## 2025-08-29 PROCEDURE — 85025 COMPLETE CBC W/AUTO DIFF WBC: CPT

## 2025-08-29 PROCEDURE — 80053 COMPREHEN METABOLIC PANEL: CPT

## 2025-08-29 RX ORDER — LEVALBUTEROL INHALATION SOLUTION 1.25 MG/3ML
1.25 SOLUTION RESPIRATORY (INHALATION) ONCE
Status: COMPLETED | OUTPATIENT
Start: 2025-08-29 | End: 2025-08-29

## 2025-08-29 RX ORDER — SODIUM CHLORIDE FOR INHALATION 0.9 %
3 VIAL, NEBULIZER (ML) INHALATION ONCE
Status: COMPLETED | OUTPATIENT
Start: 2025-08-29 | End: 2025-08-29

## 2025-08-29 RX ORDER — 0.9 % SODIUM CHLORIDE 0.9 %
1000 INTRAVENOUS SOLUTION INTRAVENOUS ONCE
Status: COMPLETED | OUTPATIENT
Start: 2025-08-29 | End: 2025-08-29

## 2025-08-29 RX ADMIN — LEVALBUTEROL HYDROCHLORIDE 1.25 MG: 1.25 SOLUTION RESPIRATORY (INHALATION) at 19:31

## 2025-08-29 RX ADMIN — SODIUM CHLORIDE 1000 ML: 9 INJECTION, SOLUTION INTRAVENOUS at 18:12

## 2025-08-29 RX ADMIN — SODIUM CHLORIDE 1000 ML: 9 INJECTION, SOLUTION INTRAVENOUS at 19:22

## 2025-08-29 RX ADMIN — ISODIUM CHLORIDE 3 ML: 0.03 SOLUTION RESPIRATORY (INHALATION) at 19:33

## 2025-08-29 ASSESSMENT — ENCOUNTER SYMPTOMS
ABDOMINAL DISTENTION: 0
CHEST TIGHTNESS: 0
ABDOMINAL PAIN: 0
BACK PAIN: 0
NAUSEA: 0
SHORTNESS OF BREATH: 0
DIARRHEA: 0
PHOTOPHOBIA: 0
VOMITING: 0
COUGH: 0

## 2025-08-29 ASSESSMENT — PAIN SCALES - GENERAL: PAINLEVEL_OUTOF10: 0

## 2025-08-29 ASSESSMENT — PAIN - FUNCTIONAL ASSESSMENT: PAIN_FUNCTIONAL_ASSESSMENT: 0-10

## 2025-08-30 LAB
EKG ATRIAL RATE: 105 BPM
EKG P AXIS: 80 DEGREES
EKG P-R INTERVAL: 130 MS
EKG Q-T INTERVAL: 354 MS
EKG QRS DURATION: 86 MS
EKG QTC CALCULATION (BAZETT): 467 MS
EKG R AXIS: 83 DEGREES
EKG T AXIS: 85 DEGREES
EKG VENTRICULAR RATE: 105 BPM
